# Patient Record
Sex: MALE | Race: WHITE | NOT HISPANIC OR LATINO | Employment: FULL TIME | URBAN - NONMETROPOLITAN AREA
[De-identification: names, ages, dates, MRNs, and addresses within clinical notes are randomized per-mention and may not be internally consistent; named-entity substitution may affect disease eponyms.]

---

## 2023-12-18 ENCOUNTER — APPOINTMENT (OUTPATIENT)
Dept: GENERAL RADIOLOGY | Facility: HOSPITAL | Age: 48
End: 2023-12-18
Payer: OTHER GOVERNMENT

## 2023-12-18 ENCOUNTER — HOSPITAL ENCOUNTER (EMERGENCY)
Facility: HOSPITAL | Age: 48
Discharge: HOME | End: 2023-12-18
Attending: EMERGENCY MEDICINE | Admitting: EMERGENCY MEDICINE
Payer: OTHER GOVERNMENT

## 2023-12-18 VITALS
TEMPERATURE: 97.9 F | WEIGHT: 315 LBS | HEIGHT: 74 IN | SYSTOLIC BLOOD PRESSURE: 137 MMHG | BODY MASS INDEX: 40.43 KG/M2 | RESPIRATION RATE: 18 BRPM | HEART RATE: 63 BPM | OXYGEN SATURATION: 99 % | DIASTOLIC BLOOD PRESSURE: 94 MMHG

## 2023-12-18 DIAGNOSIS — R07.9 CHEST PAIN, UNSPECIFIED TYPE: Primary | ICD-10-CM

## 2023-12-18 LAB
ALBUMIN SERPL-MCNC: 4.6 G/DL (ref 3.5–5.2)
ALBUMIN/GLOB SERPL: 1.5 G/DL
ALP SERPL-CCNC: 93 U/L (ref 39–117)
ALT SERPL W P-5'-P-CCNC: 19 U/L (ref 1–41)
AMPHET+METHAMPHET UR QL: NEGATIVE
AMPHETAMINES UR QL: NEGATIVE
ANION GAP SERPL CALCULATED.3IONS-SCNC: 11 MMOL/L (ref 5–15)
AST SERPL-CCNC: 23 U/L (ref 1–40)
BARBITURATES UR QL SCN: NEGATIVE
BASOPHILS # BLD AUTO: 0.04 10*3/MM3 (ref 0–0.2)
BASOPHILS NFR BLD AUTO: 0.6 % (ref 0–1.5)
BENZODIAZ UR QL SCN: NEGATIVE
BILIRUB SERPL-MCNC: 0.3 MG/DL (ref 0–1.2)
BUN SERPL-MCNC: 12 MG/DL (ref 6–20)
BUN/CREAT SERPL: 11.5 (ref 7–25)
BUPRENORPHINE SERPL-MCNC: NEGATIVE NG/ML
CALCIUM SPEC-SCNC: 10.1 MG/DL (ref 8.6–10.5)
CANNABINOIDS SERPL QL: NEGATIVE
CHLORIDE SERPL-SCNC: 104 MMOL/L (ref 98–107)
CO2 SERPL-SCNC: 26 MMOL/L (ref 22–29)
COCAINE UR QL: NEGATIVE
CREAT SERPL-MCNC: 1.04 MG/DL (ref 0.76–1.27)
D DIMER PPP FEU-MCNC: <0.27 MCGFEU/ML (ref 0–0.5)
DEPRECATED RDW RBC AUTO: 41.6 FL (ref 37–54)
EGFRCR SERPLBLD CKD-EPI 2021: 88.6 ML/MIN/1.73
EOSINOPHIL # BLD AUTO: 0.14 10*3/MM3 (ref 0–0.4)
EOSINOPHIL NFR BLD AUTO: 2.2 % (ref 0.3–6.2)
ERYTHROCYTE [DISTWIDTH] IN BLOOD BY AUTOMATED COUNT: 12.4 % (ref 12.3–15.4)
ETHANOL UR QL: <0.01 %
FENTANYL UR-MCNC: NEGATIVE NG/ML
GEN 5 2HR TROPONIN T REFLEX: <6 NG/L
GLOBULIN UR ELPH-MCNC: 3 GM/DL
GLUCOSE SERPL-MCNC: 89 MG/DL (ref 65–99)
HCT VFR BLD AUTO: 42.1 % (ref 37.5–51)
HGB BLD-MCNC: 14 G/DL (ref 13–17.7)
HOLD SPECIMEN: NORMAL
HOLD SPECIMEN: NORMAL
IMM GRANULOCYTES # BLD AUTO: 0.01 10*3/MM3 (ref 0–0.05)
IMM GRANULOCYTES NFR BLD AUTO: 0.2 % (ref 0–0.5)
LYMPHOCYTES # BLD AUTO: 1.83 10*3/MM3 (ref 0.7–3.1)
LYMPHOCYTES NFR BLD AUTO: 28.4 % (ref 19.6–45.3)
MCH RBC QN AUTO: 30.7 PG (ref 26.6–33)
MCHC RBC AUTO-ENTMCNC: 33.3 G/DL (ref 31.5–35.7)
MCV RBC AUTO: 92.3 FL (ref 79–97)
METHADONE UR QL SCN: NEGATIVE
MONOCYTES # BLD AUTO: 0.26 10*3/MM3 (ref 0.1–0.9)
MONOCYTES NFR BLD AUTO: 4 % (ref 5–12)
NEUTROPHILS NFR BLD AUTO: 4.16 10*3/MM3 (ref 1.7–7)
NEUTROPHILS NFR BLD AUTO: 64.6 % (ref 42.7–76)
NRBC BLD AUTO-RTO: 0 /100 WBC (ref 0–0.2)
NT-PROBNP SERPL-MCNC: <36 PG/ML (ref 0–450)
OPIATES UR QL: NEGATIVE
OXYCODONE UR QL SCN: NEGATIVE
PCP UR QL SCN: NEGATIVE
PLATELET # BLD AUTO: 271 10*3/MM3 (ref 140–450)
PMV BLD AUTO: 10.1 FL (ref 6–12)
POTASSIUM SERPL-SCNC: 4 MMOL/L (ref 3.5–5.2)
PROT SERPL-MCNC: 7.6 G/DL (ref 6–8.5)
RBC # BLD AUTO: 4.56 10*6/MM3 (ref 4.14–5.8)
SODIUM SERPL-SCNC: 141 MMOL/L (ref 136–145)
TRICYCLICS UR QL SCN: NEGATIVE
TROPONIN T DELTA: NORMAL
TROPONIN T SERPL HS-MCNC: 7 NG/L
WBC NRBC COR # BLD AUTO: 6.44 10*3/MM3 (ref 3.4–10.8)
WHOLE BLOOD HOLD COAG: NORMAL
WHOLE BLOOD HOLD SPECIMEN: NORMAL

## 2023-12-18 PROCEDURE — 80053 COMPREHEN METABOLIC PANEL: CPT | Performed by: EMERGENCY MEDICINE

## 2023-12-18 PROCEDURE — 71045 X-RAY EXAM CHEST 1 VIEW: CPT

## 2023-12-18 PROCEDURE — 99284 EMERGENCY DEPT VISIT MOD MDM: CPT

## 2023-12-18 PROCEDURE — 93005 ELECTROCARDIOGRAM TRACING: CPT

## 2023-12-18 PROCEDURE — 80307 DRUG TEST PRSMV CHEM ANLYZR: CPT | Performed by: EMERGENCY MEDICINE

## 2023-12-18 PROCEDURE — 85025 COMPLETE CBC W/AUTO DIFF WBC: CPT | Performed by: EMERGENCY MEDICINE

## 2023-12-18 PROCEDURE — 93005 ELECTROCARDIOGRAM TRACING: CPT | Performed by: EMERGENCY MEDICINE

## 2023-12-18 PROCEDURE — 85379 FIBRIN DEGRADATION QUANT: CPT | Performed by: EMERGENCY MEDICINE

## 2023-12-18 PROCEDURE — 82077 ASSAY SPEC XCP UR&BREATH IA: CPT | Performed by: EMERGENCY MEDICINE

## 2023-12-18 PROCEDURE — 84484 ASSAY OF TROPONIN QUANT: CPT | Performed by: EMERGENCY MEDICINE

## 2023-12-18 PROCEDURE — 36415 COLL VENOUS BLD VENIPUNCTURE: CPT

## 2023-12-18 PROCEDURE — 83880 ASSAY OF NATRIURETIC PEPTIDE: CPT | Performed by: EMERGENCY MEDICINE

## 2023-12-18 RX ORDER — ASPIRIN 81 MG/1
324 TABLET, CHEWABLE ORAL ONCE
Status: COMPLETED | OUTPATIENT
Start: 2023-12-18 | End: 2023-12-18

## 2023-12-18 RX ORDER — ASPIRIN 81 MG/1
81 TABLET ORAL DAILY
Qty: 30 TABLET | Refills: 0 | Status: SHIPPED | OUTPATIENT
Start: 2023-12-18 | End: 2024-01-17

## 2023-12-18 RX ORDER — SODIUM CHLORIDE 0.9 % (FLUSH) 0.9 %
10 SYRINGE (ML) INJECTION AS NEEDED
Status: DISCONTINUED | OUTPATIENT
Start: 2023-12-18 | End: 2023-12-18 | Stop reason: HOSPADM

## 2023-12-18 RX ADMIN — ASPIRIN 324 MG: 81 TABLET, CHEWABLE ORAL at 11:24

## 2023-12-18 NOTE — ED PROVIDER NOTES
Subjective   History of Present Illness  Patient is a 48 years old who came the echoing of chest discomfort left-sided chest pain reproducible on taking deep breath on palpation.  No history of trauma no history of any cardiac disease has a positive history of heart disease in the family.    Chest Pain  Pain location:  L chest  Pain quality: aching and tightness    Pain radiates to:  Does not radiate  Pain severity:  Moderate  Onset quality:  Gradual  Timing:  Constant  Progression:  Worsening  Chronicity:  New  Context: breathing    Context: not drug use, not eating, not lifting, not raising an arm, not at rest and not trauma    Relieved by:  Nothing  Worsened by:  Coughing and deep breathing  Ineffective treatments:  None tried  Associated symptoms: shortness of breath    Associated symptoms: no abdominal pain, no altered mental status, no anorexia, no back pain, no claudication, no cough, no dysphagia, no fatigue, no fever, no headache, no heartburn, no lower extremity edema, no nausea, no numbness, no orthopnea, no palpitations, no vomiting and no weakness    Risk factors: male sex and obesity    Risk factors: no aortic disease, no birth control, no coronary artery disease, no diabetes mellitus, no Tamir-Danlos syndrome, no high cholesterol, no immobilization, no Marfan's syndrome, no prior DVT/PE and no smoking    Shortness of Breath  Associated symptoms: chest pain    Associated symptoms: no abdominal pain, no claudication, no cough, no fever, no headaches, no neck pain and no vomiting        Review of Systems   Constitutional: Negative.  Negative for fatigue and fever.   HENT: Negative.  Negative for trouble swallowing.    Respiratory:  Positive for shortness of breath. Negative for cough.    Cardiovascular:  Positive for chest pain. Negative for palpitations, orthopnea and claudication.   Gastrointestinal: Negative.  Negative for abdominal distention, abdominal pain, anorexia, heartburn, nausea and  vomiting.   Endocrine: Negative.    Genitourinary: Negative.    Musculoskeletal: Negative.  Negative for back pain and neck pain.   Skin:  Negative for color change and pallor.   Neurological: Negative.  Negative for syncope, weakness, light-headedness, numbness and headaches.   Hematological: Negative.  Does not bruise/bleed easily.   All other systems reviewed and are negative.      Past Medical History:   Diagnosis Date    Back pain     Hyperlipidemia     Sleep apnea     Tremors of nervous system        Allergies   Allergen Reactions    Coconut Swelling       Past Surgical History:   Procedure Laterality Date    SHOULDER SURGERY         History reviewed. No pertinent family history.    Social History     Socioeconomic History    Marital status:    Tobacco Use    Smoking status: Never    Smokeless tobacco: Current     Types: Chew   Substance and Sexual Activity    Alcohol use: Not Currently    Drug use: Not Currently           Objective   Physical Exam  Vitals and nursing note reviewed. Exam conducted with a chaperone present.   Constitutional:       General: He is not in acute distress.     Appearance: Normal appearance. He is well-developed. He is not toxic-appearing.   HENT:      Head: Normocephalic and atraumatic.      Nose: Nose normal.      Mouth/Throat:      Mouth: Mucous membranes are moist.      Pharynx: Uvula midline.   Eyes:      General: Lids are normal. Lids are everted, no foreign bodies appreciated.      Conjunctiva/sclera: Conjunctivae normal.      Pupils: Pupils are equal, round, and reactive to light.   Neck:      Vascular: Normal carotid pulses. No carotid bruit or JVD.      Trachea: Trachea and phonation normal. No tracheal deviation.   Cardiovascular:      Rate and Rhythm: Normal rate and regular rhythm.      Chest Wall: PMI is not displaced.      Pulses: Normal pulses.      Heart sounds: Normal heart sounds.      No gallop.   Pulmonary:      Effort: Pulmonary effort is normal. No  tachypnea, accessory muscle usage or respiratory distress.      Breath sounds: Normal breath sounds. No stridor. No decreased breath sounds, wheezing, rhonchi or rales.   Chest:      Comments: Chest wall tenderness on by palpation  Abdominal:      General: Bowel sounds are normal. There is no distension.      Palpations: Abdomen is soft.      Tenderness: There is no abdominal tenderness.   Musculoskeletal:         General: No swelling. Normal range of motion.      Cervical back: Full passive range of motion without pain, normal range of motion and neck supple. No rigidity.      Comments: Lower extremity exam bilaterally is unremarkable.  There is no right or left calf tenderness .  There is no palpable venous cord.  No obvious difference in the size of the legs.  No pitting edema.  The dorsalis pedis and posterior tibial femoral and popliteal pulses are palpable and +2 bilaterally.  Homans sign is negative   Skin:     General: Skin is warm and dry.      Capillary Refill: Capillary refill takes less than 2 seconds.      Coloration: Skin is not jaundiced or pale.      Nails: There is no clubbing.   Neurological:      General: No focal deficit present.      Mental Status: He is alert and oriented to person, place, and time.      GCS: GCS eye subscore is 4. GCS verbal subscore is 5. GCS motor subscore is 6.      Cranial Nerves: No cranial nerve deficit.      Motor: Motor function is intact.      Gait: Gait normal.      Deep Tendon Reflexes: Reflexes are normal and symmetric. Reflexes normal.   Psychiatric:         Speech: Speech normal.         Behavior: Behavior normal.         Procedures           ED Course  ED Course as of 12/18/23 1535   Mon Dec 18, 2023   1158 bradycardia [TS]   1305 nsr [TS]   1533 Patient given reproducible chest discomfort and tightness.  Workup is negative dimer is negative.  I have discussed this case at length with the patient offered him a stress test but he does not want to stay for that he  is agreeable to getting as an outpatient. [TS]   1533 Patient is awake and alert in no distress.  And in no pain. [TS]      ED Course User Index  [TS] Dereck Romo MD                HEART Score: 1                              Medical Decision Making  Differential Diagnosis:  I considered chest wall pain, muscle strain, costochondritis, pleurisy, rib fracture, herpes zoster, cardiovascular etiology, myocardial infarction, intermediate coronary syndrome, unstable angina, angina, aortic dissection, pericarditis, pulmonary etiology, pulmonary embolism, pneumonia, pneumothorax, lung cancer, gastroesophageal reflux disease, esophagitis, esophageal spasm and gastrointestinal etiology as a possible cause of chest pain in this patient. This is a partial list of diagnoses considered.        Problems Addressed:  Chest pain, unspecified type: acute illness or injury     Details: Nonspecific chest pain pain-free at this time.    Amount and/or Complexity of Data Reviewed  Labs: ordered.     Details: Labs are normal  Radiology: ordered.     Details: Chest x-ray is negative  ECG/medicine tests: ordered.     Details: Nonischemic    Risk  OTC drugs.  Prescription drug management.  Risk Details: PERC score 0  Years Algorithm for Pulmonary Embolus  To be used in hemodynamically stable patient >18 years old    No signs of DVT are present, there is no hemoptysis, PE is not the most likely diagnosis and the D-dimer is not greater or equal to 1000 ng/mL. Therefore PE is excluded . The Years algorithm rules out PE (0.43 % with symptomatic VTE during 3-month follow-up)  Heart score 1  This patient presents with chest pain , patient arrived hemodynamically stable was placed on the monitor and IV access obtained EKG was obtained and did not reveal any malignant/unstable dysrhythmias any acute ST elevation, no evidence of Brugada, or significantly prolonged QT .  Presentation not consistent with other acute, emergent cause of chest pain at  this time.  Low suspicion for acute PE is low risk per Wells and Years criteria and no evidence of DVT such as calf swelling, tenderness, palpable tortuous lower extremity vein, or Homans' sign.  Low suspicion for pneumothorax as the patient is saturating well and has no radiographic evidence of a pneumothorax.  Low suspicion for dissection there is no widened mediastinum, hypotension, pulses deficit, and no tearing back/abdominal pain.  Low suspicion for tamponade as there is no JVD, muffled heart sounds, electrical alternans on EKG, and no hypotension.  Low suspicion for pericarditis as there is no diffuse ST elevation or MO depression and the patient is afebrile.  No evidence of GI bleed per patient's history.  Low suspicion for CHF exacerbation as there is no evidence of volume overload and no evidence of severely elevated BNP.  Low suspicion for pneumonia since the patient has no fever no productive cough no chest x-ray findings of pneumonia and no leukocytosis.         Final diagnoses:   Chest pain, unspecified type       ED Disposition  ED Disposition       ED Disposition   Discharge    Condition   Stable    Comment   --               Fran Elena MD  [address] [place] [address] [phone]    Schedule an appointment as soon as possible for a visit            Medication List        New Prescriptions      aspirin 81 MG EC tablet  Take 1 tablet by mouth Daily for 30 days.               Where to Get Your Medications        These medications were sent to [id number] - [place], [place] - [address] - [phone]  - [phone] FX  [address] [place] [address]      Phone: [phone]   aspirin 81 MG EC tablet            Dereck Romo MD  12/18/23 1150       Dereck Romo MD  12/18/23 7306

## 2023-12-19 LAB
QT INTERVAL: 410 MS
QT INTERVAL: 434 MS
QTC INTERVAL: 388 MS
QTC INTERVAL: 415 MS

## 2024-05-03 ENCOUNTER — APPOINTMENT (OUTPATIENT)
Dept: OTHER | Facility: HOSPITAL | Age: 49
End: 2024-05-03
Payer: OTHER GOVERNMENT

## 2024-05-22 ENCOUNTER — OFFICE VISIT (OUTPATIENT)
Dept: NEUROSURGERY | Facility: CLINIC | Age: 49
End: 2024-05-22
Payer: OTHER GOVERNMENT

## 2024-05-22 VITALS — WEIGHT: 315 LBS | HEIGHT: 74 IN | BODY MASS INDEX: 40.43 KG/M2

## 2024-05-22 DIAGNOSIS — M54.12 CERVICAL RADICULOPATHY: ICD-10-CM

## 2024-05-22 DIAGNOSIS — M51.26 LUMBAR DISC HERNIATION: ICD-10-CM

## 2024-05-22 DIAGNOSIS — Z78.9 NONSMOKER: ICD-10-CM

## 2024-05-22 DIAGNOSIS — M51.36 LUMBAR DEGENERATIVE DISC DISEASE: ICD-10-CM

## 2024-05-22 DIAGNOSIS — E66.01 CLASS 3 SEVERE OBESITY DUE TO EXCESS CALORIES WITHOUT SERIOUS COMORBIDITY WITH BODY MASS INDEX (BMI) OF 40.0 TO 44.9 IN ADULT: ICD-10-CM

## 2024-05-22 DIAGNOSIS — M54.16 LUMBAR RADICULOPATHY: ICD-10-CM

## 2024-05-22 DIAGNOSIS — M50.321 OTHER CERVICAL DISC DEGENERATION AT C4-C5 LEVEL: ICD-10-CM

## 2024-05-22 RX ORDER — CYCLOBENZAPRINE HCL 10 MG
10 TABLET ORAL 3 TIMES DAILY PRN
COMMUNITY

## 2024-05-22 RX ORDER — LITHIUM CARBONATE 300 MG/1
300 CAPSULE ORAL DAILY
COMMUNITY

## 2024-05-22 RX ORDER — MONTELUKAST SODIUM 10 MG/1
10 TABLET ORAL EVERY EVENING
COMMUNITY

## 2024-05-22 RX ORDER — CYCLOBENZAPRINE HCL 10 MG
10 TABLET ORAL 3 TIMES DAILY PRN
Qty: 90 TABLET | Refills: 2 | Status: SHIPPED | OUTPATIENT
Start: 2024-05-22

## 2024-05-22 RX ORDER — DICLOFENAC SODIUM AND MISOPROSTOL 75; 200 MG/1; UG/1
1 TABLET, DELAYED RELEASE ORAL 2 TIMES DAILY
COMMUNITY

## 2024-05-22 RX ORDER — GEMFIBROZIL 600 MG/1
600 TABLET, FILM COATED ORAL
COMMUNITY

## 2024-05-22 RX ORDER — GABAPENTIN 100 MG/1
100 CAPSULE ORAL 3 TIMES DAILY
Qty: 90 CAPSULE | Refills: 2 | Status: SHIPPED | OUTPATIENT
Start: 2024-05-22

## 2024-05-22 RX ORDER — TAMSULOSIN HYDROCHLORIDE 0.4 MG/1
1 CAPSULE ORAL DAILY
COMMUNITY

## 2024-05-22 NOTE — PATIENT INSTRUCTIONS
"BMI for Adults  What is BMI?  Body mass index (BMI) is a number that is calculated from a person's weight and height. BMI can help estimate how much of a person's weight is composed of fat. BMI does not measure body fat directly. Rather, it is an alternative to procedures that directly measure body fat, which can be difficult and expensive.  BMI can help identify people who may be at higher risk for certain medical problems.  What are BMI measurements used for?  BMI is used as a screening tool to identify possible weight problems. It helps determine whether a person is obese, overweight, a healthy weight, or underweight.  BMI is useful for:  Identifying a weight problem that may be related to a medical condition or may increase the risk for medical problems.  Promoting changes, such as changes in diet and exercise, to help reach a healthy weight. BMI screening can be repeated to see if these changes are working.  How is BMI calculated?  BMI involves measuring your weight in relation to your height. Both height and weight are measured, and the BMI is calculated from those numbers. This can be done either in English ([place] or metric measurements. Note that charts and online BMI calculators are available to help you find your BMI quickly and easily without having to do these calculations yourself.  To calculate your BMI in English ([place] measurements:    Measure your weight in pounds (lb).  Multiply the number of pounds by 703.  For example, for a person who weighs 180 lb, multiply that number by 703, which equals 126,540.  Measure your height in inches. Then multiply that number by itself to get a measurement called \"inches squared.\"  For example, for a person who is 70 inches tall, the \"inches squared\" measurement is 70 inches x 70 inches, which equals 4,900 inches squared.  Divide the total from step 2 (number of lb x 703) by the total from step 3 (inches squared): 126,540 ÷ 4,900 = 25.8. This is your BMI.    To " "calculate your BMI in metric measurements:  Measure your weight in kilograms (kg).  Measure your height in meters (m). Then multiply that number by itself to get a measurement called \"meters squared.\"  For example, for a person who is 1.75 m tall, the \"meters squared\" measurement is 1.75 m x 1.75 m, which is equal to 3.1 meters squared.  Divide the number of kilograms (your weight) by the meters squared number. In this example: 70 ÷ 3.1 = 22.6. This is your BMI.  What do the results mean?  BMI charts are used to identify whether you are underweight, normal weight, overweight, or obese. The following guidelines will be used:  Underweight: BMI less than 18.5.  Normal weight: BMI between 18.5 and 24.9.  Overweight: BMI between 25 and 29.9.  Obese: BMI of 30 or above.  Keep these notes in mind:  Weight includes both fat and muscle, so someone with a muscular build, such as an athlete, may have a BMI that is higher than 24.9. In cases like these, BMI is not an accurate measure of body fat.  To determine if excess body fat is the cause of a BMI of 25 or higher, further assessments may need to be done by a health care provider.  BMI is usually interpreted in the same way for men and women.  Where to find more information  For more information about BMI, including tools to quickly calculate your BMI, go to these websites:  Centers for Disease Control and Prevention: www.cdc.gov  American Heart Association: www.heart.org  National Heart, Lung, and Blood [place]: www.nhlbi.nih.gov  Summary  Body mass index (BMI) is a number that is calculated from a person's weight and height.  BMI may help estimate how much of a person's weight is composed of fat. BMI can help identify those who may be at higher risk for certain medical problems.  BMI can be measured using English measurements or metric measurements.  BMI charts are used to identify whether you are underweight, normal weight, overweight, or obese.  This information is not " "intended to replace advice given to you by your health care provider. Make sure you discuss any questions you have with your health care provider.  Document Revised: 09/09/2020 Document Reviewed: 07/17/2020  [employer] Patient Education © 2021 [employer] https://www.nhlbi.nih.gov/files/docs/public/heart/dash_brief.pdf\">   DASH Eating Plan  DASH stands for Dietary Approaches to Stop Hypertension. The DASH eating plan is a healthy eating plan that has been shown to:  Reduce high blood pressure (hypertension).  Reduce your risk for type 2 diabetes, heart disease, and stroke.  Help with weight loss.  What are tips for following this plan?  Reading food labels  Check food labels for the amount of salt (sodium) per serving. Choose foods with less than 5 percent of the Daily Value of sodium. Generally, foods with less than 300 milligrams (mg) of sodium per serving fit into this eating plan.  To find whole grains, look for the word \"whole\" as the first word in the ingredient list.  Shopping  Buy products labeled as \"low-sodium\" or \"no salt added.\"  Buy fresh foods. Avoid canned foods and pre-made or frozen meals.  Cooking  Avoid adding salt when cooking. Use salt-free seasonings or herbs instead of table salt or sea salt. Check with your health care provider or pharmacist before using salt substitutes.  Do not zamorano foods. Cook foods using healthy methods such as baking, boiling, grilling, roasting, and broiling instead.  Cook with heart-healthy oils, such as olive, canola, avocado, soybean, or sunflower oil.  Meal planning    Eat a balanced diet that includes:  4 or more servings of fruits and 4 or more servings of vegetables each day. Try to fill one-half of your plate with fruits and vegetables.  6-8 servings of whole grains each day.  Less than 6 oz (170 g) of lean meat, poultry, or fish each day. A 3-oz (85-g) serving of meat is about the same size as a deck of cards. One egg equals 1 oz (28 g).  2-3 servings of low-fat " dairy each day. One serving is 1 cup (237 mL).  1 serving of nuts, seeds, or beans 5 times each week.  2-3 servings of heart-healthy fats. Healthy fats called omega-3 fatty acids are found in foods such as walnuts, flaxseeds, fortified milks, and eggs. These fats are also found in cold-water fish, such as sardines, salmon, and mackerel.  Limit how much you eat of:  Canned or prepackaged foods.  Food that is high in trans fat, such as some fried foods.  Food that is high in saturated fat, such as fatty meat.  Desserts and other sweets, sugary drinks, and other foods with added sugar.  Full-fat dairy products.  Do not salt foods before eating.  Do not eat more than 4 egg yolks a week.  Try to eat at least 2 vegetarian meals a week.  Eat more home-cooked food and less restaurant, buffet, and fast food.    Lifestyle  When eating at a restaurant, ask that your food be prepared with less salt or no salt, if possible.  If you drink alcohol:  Limit how much you use to:  0-1 drink a day for women who are not pregnant.  0-2 drinks a day for men.  Be aware of how much alcohol is in your drink. In the [place] one drink equals one 12 oz bottle of beer (355 mL), one 5 oz glass of wine (148 mL), or one 1½ oz glass of hard liquor (44 mL).  General information  Avoid eating more than 2,300 mg of salt a day. If you have hypertension, you may need to reduce your sodium intake to 1,500 mg a day.  Work with your health care provider to maintain a healthy body weight or to lose weight. Ask what an ideal weight is for you.  Get at least 30 minutes of exercise that causes your heart to beat faster (aerobic exercise) most days of the week. Activities may include walking, swimming, or biking.  Work with your health care provider or dietitian to adjust your eating plan to your individual calorie needs.  What foods should I eat?  Fruits  All fresh, dried, or frozen fruit. Canned fruit in natural juice (without added sugar).  Vegetables  Fresh or  frozen vegetables (raw, steamed, roasted, or grilled). Low-sodium or reduced-sodium tomato and vegetable juice. Low-sodium or reduced-sodium tomato sauce and tomato paste. Low-sodium or reduced-sodium canned vegetables.  Grains  Whole-grain or whole-wheat bread. Whole-grain or whole-wheat pasta. Brown rice. Oatmeal. Quinoa. Bulgur. Whole-grain and low-sodium cereals. Portia bread. Low-fat, low-sodium crackers. Whole-wheat flour tortillas.  Meats and other proteins  Skinless chicken or turkey. Ground chicken or turkey. Pork with fat trimmed off. Fish and seafood. Egg whites. Dried beans, peas, or lentils. Unsalted nuts, nut butters, and seeds. Unsalted canned beans. Lean cuts of beef with fat trimmed off. Low-sodium, lean precooked or cured meat, such as sausages or meat loaves.  Dairy  Low-fat (1%) or fat-free (skim) milk. Reduced-fat, low-fat, or fat-free cheeses. Nonfat, low-sodium ricotta or cottage cheese. Low-fat or nonfat yogurt. Low-fat, low-sodium cheese.  Fats and oils  Soft margarine without trans fats. Vegetable oil. Reduced-fat, low-fat, or light mayonnaise and salad dressings (reduced-sodium). Canola, safflower, olive, avocado, soybean, and sunflower oils. Avocado.  Seasonings and condiments  Herbs. Spices. Seasoning mixes without salt.  Other foods  Unsalted popcorn and pretzels. Fat-free sweets.  The items listed above may not be a complete list of foods and beverages you can eat. Contact a dietitian for more information.  What foods should I avoid?  Fruits  Canned fruit in a light or heavy syrup. Fried fruit. Fruit in cream or butter sauce.  Vegetables  Creamed or fried vegetables. Vegetables in a cheese sauce. Regular canned vegetables (not low-sodium or reduced-sodium). Regular canned tomato sauce and paste (not low-sodium or reduced-sodium). Regular tomato and vegetable juice (not low-sodium or reduced-sodium). Pickles. Olives.  Grains  Baked goods made with fat, such as croissants, muffins, or some  breads. Dry pasta or rice meal packs.  Meats and other proteins  Fatty cuts of meat. Ribs. Fried meat. Gautam. Bologna, salami, and other precooked or cured meats, such as sausages or meat loaves. Fat from the back of a pig (fatback). Bratwurst. Salted nuts and seeds. Canned beans with added salt. Canned or smoked fish. Whole eggs or egg yolks. Chicken or turkey with skin.  Dairy  Whole or 2% milk, cream, and half-and-half. Whole or full-fat cream cheese. Whole-fat or sweetened yogurt. Full-fat cheese. Nondairy creamers. Whipped toppings. Processed cheese and cheese spreads.  Fats and oils  Butter. Stick margarine. Lard. Shortening. Ghee. Gautam fat. Tropical oils, such as coconut, palm kernel, or palm oil.  Seasonings and condiments  Onion salt, garlic salt, seasoned salt, table salt, and sea salt. Worcestershire sauce. Tartar sauce. Barbecue sauce. Teriyaki sauce. Soy sauce, including reduced-sodium. Steak sauce. Canned and packaged gravies. Fish sauce. Oyster sauce. Cocktail sauce. Store-bought horseradish. Ketchup. Mustard. Meat flavorings and tenderizers. Bouillon cubes. Hot sauces. Pre-made or packaged marinades. Pre-made or packaged taco seasonings. Relishes. Regular salad dressings.  Other foods  Salted popcorn and pretzels.  The items listed above may not be a complete list of foods and beverages you should avoid. Contact a dietitian for more information.  Where to find more information  National Heart, Lung, and Blood [place]: www.nhlbi.nih.gov  American Heart Association: www.heart.org  Academy of Nutrition and Dietetics: www.eatright.org  National Kidney Foundation: www.kidney.org  Summary  The DASH eating plan is a healthy eating plan that has been shown to reduce high blood pressure (hypertension). It may also reduce your risk for type 2 diabetes, heart disease, and stroke.  When on the DASH eating plan, aim to eat more fresh fruits and vegetables, whole grains, lean proteins, low-fat dairy, and  heart-healthy fats.  With the DASH eating plan, you should limit salt (sodium) intake to 2,300 mg a day. If you have hypertension, you may need to reduce your sodium intake to 1,500 mg a day.  Work with your health care provider or dietitian to adjust your eating plan to your individual calorie needs.  This information is not intended to replace advice given to you by your health care provider. Make sure you discuss any questions you have with your health care provider.  Document Revised: 11/20/2020 Document Reviewed: 11/20/2020  [employer] Patient Education © 2021 [employer] High-Protein and High-Calorie Diet  Eating high-protein and high-calorie foods can help you to gain weight, heal after an injury, and recover after an illness or surgery. The specific amount of daily protein and calories you need depends on:  Your body weight.  The reason this diet is recommended for you.  What is my plan?  Generally, a high-protein, high-calorie diet involves:  Eating 250-500 extra calories each day.  Making sure that you get enough of your daily calories from protein. Ask your health care provider how many of your calories should come from protein.  Talk with a health care provider, such as a diet and nutrition specialist (dietitian), about how much protein and how many calories you need each day. Follow the diet as directed by your health care provider.  What are tips for following this plan?    Preparing meals  Add whole milk, half-and-half, or heavy cream to cereal, pudding, soup, or hot cocoa.  Add whole milk to instant breakfast drinks.  Add peanut butter to oatmeal or smoothies.  Add powdered milk to baked goods, smoothies, or milkshakes.  Add powdered milk, cream, or butter to mashed potatoes.  Add cheese to cooked vegetables.  Make whole-milk yogurt parfaits. Top them with granola, fruit, or nuts.  Add cottage cheese to your fruit.  Add avocado, cheese, or both to sandwiches or salads.  Add meat, poultry, or seafood  to rice, pasta, casseroles, salads, and soups.  Use mayonnaise when making egg salad, chicken salad, or tuna salad.  Use peanut butter as a dip for vegetables or as a topping for pretzels, celery, or crackers.  Add beans to casseroles, dips, and spreads.  Add pureed beans to sauces and soups.  Replace calorie-free drinks with calorie-containing drinks, such as milk and fruit juice.  Replace water with milk or heavy cream when making foods such as oatmeal, pudding, or cocoa.  General instructions  Ask your health care provider if you should take a nutritional supplement.  Try to eat six small meals each day instead of three large meals.  Eat a balanced diet. In each meal, include one food that is high in protein.  Keep nutritious snacks available, such as nuts, trail mixes, dried fruit, and yogurt.  If you have kidney disease or diabetes, talk with your health care provider about how much protein is safe for you. Too much protein may put extra stress on your kidneys.  Drink your calories. Choose high-calorie drinks and have them after your meals.  What high-protein foods should I eat?    Vegetables  Soybeans. Peas.  Grains  Quinoa. Bulgur wheat.  Meats and other proteins  Beef, pork, and poultry. Fish and seafood. Eggs. Tofu. Textured vegetable protein (TVP). Peanut butter. Nuts and seeds. Dried beans. Protein powders.  Dairy  Whole milk. Whole-milk yogurt. Powdered milk. Cheese. Cottage Cheese. Eggnog.  Beverages  High-protein supplement drinks. Soy milk.  Other foods  Protein bars.  The items listed above may not be a complete list of high-protein foods and beverages. Contact a dietitian for more options.  What high-calorie foods should I eat?  Fruits  Dried fruit. Fruit leather. Canned fruit in syrup. Fruit juice. Avocado.  Vegetables  Vegetables cooked in oil or butter. Fried potatoes.  Grains  Pasta. Quick breads. Muffins. Pancakes. Ready-to-eat cereal.  Meats and other proteins  Peanut butter. Nuts and  seeds.  Dairy  Heavy cream. Whipped cream. Cream cheese. Sour cream. Ice cream. Custard. Pudding.  Beverages  Meal-replacement beverages. Nutrition shakes. Fruit juice. Sugar-sweetened soft drinks.  Seasonings and condiments  Salad dressing. Mayonnaise. Edil sauce. Fruit preserves or jelly. Honey. Syrup.  Sweets and desserts  Cake. Cookies. Pie. Pastries. Candy bars. Chocolate.  Fats and oils  Butter or margarine. Oil. Gravy.  Other foods  Meal-replacement bars.  The items listed above may not be a complete list of high-calorie foods and beverages. Contact a dietitian for more options.  Summary  A high-protein, high-calorie diet can help you gain weight or heal faster after an injury, illness, or surgery.  To increase your protein and calories, add ingredients such as whole milk, peanut butter, cheese, beans, meat, or seafood to meal items.  To get enough extra calories each day, include high-calorie foods and beverages at each meal.  Adding a high-calorie drink or shake can be an easy way to help you get enough calories each day. Talk with your healthcare provider or dietitian about the best options for you.  This information is not intended to replace advice given to you by your health care provider. Make sure you discuss any questions you have with your health care provider.  Document Revised: 11/30/2018 Document Reviewed: 10/30/2018  [employer] Patient Education © 2021 [employer]

## 2024-05-22 NOTE — PROGRESS NOTES
Chief complaint:   Chief Complaint   Patient presents with    NECK & BACK PAIN     Pt constant neck and lbp. Pt states he has been having numbness and tingling R hand and arm. Pt states he has not had any physical therapy or pain mgmt. Pt states he has seen a chiropractor last visit 05/20/24.        Subjective     HPI: This is a 48-year-old male gentleman who was referred to us by MONTSE Chung for neck and back pain.  The patient says that the pain in his neck has been going on for months but over the last few weeks he started to have pain going over to his right upper extremity.  Currently the pain in his neck is constant.  It is worse with working overhead or turning his neck.  It is better with laying down.  He has pain that radiates over into his right upper extremity in a radicular fashion all the way to his hand along with numbness and tingling.  This is intermittent.  It is worse with repetitive movements.  It is better with changing his position.  He is fixing to be evaluated for left shoulder issue as well.  Denies any bowel or bladder incontinence.  Is not done any recent physical therapy or pain management injections.  He is attempted chiropractic care without any improvement.  He does take diclofenac and Flexeril.    Patient is also been complaining of back pain that is been going on for over 4 years.  Currently the pain in his back is constant.  It is worse with twisting and better with heat.  He has pain that radiates into his left lower extremity in a lateral radicular fashion to his knee.  This pain is intermittent.  It is worse with movement.  It is better with resting.  He has not done any recent physical therapy or pain management injections.  He is attempted chiropractic care without any improvement.  He is right-hand dominant.  He works as a .  He is .  Denies any cigarettes or illicit drug use.  He does drink alcohol.    Review of Systems   Constitutional:  Positive for  "fatigue.   Genitourinary:  Positive for difficulty urinating and genital sores.   Musculoskeletal:  Positive for back pain, myalgias, neck pain and neck stiffness.   Neurological:  Positive for tremors, weakness and numbness.   Psychiatric/Behavioral:  Positive for hallucinations. The patient is nervous/anxious.    All other systems reviewed and are negative.       Past Medical History:   Diagnosis Date    Anxiousness     Back pain     Back pain     Difficulty urinating     Genital sore     Hallucinations     Hyperlipidemia     Multiple food allergies     Neck pain     Nervousness     NS (neck stiffness)     Numbness and tingling of right upper extremity     Sleep apnea     Tremors of nervous system     Tremors of nervous system     Weakness      Past Surgical History:   Procedure Laterality Date    SHOULDER SURGERY Bilateral 2004 2016     History reviewed. No pertinent family history.  Social History     Tobacco Use    Smoking status: Never    Smokeless tobacco: Current     Types: Chew   Vaping Use    Vaping status: Never Used   Substance Use Topics    Alcohol use: Not Currently    Drug use: Not Currently     (Not in a hospital admission)    Allergies:  Coconut, Coconut (cocos nucifera), and Tramadol    Objective      Vital Signs  Ht 188 cm (74\")   Wt (!) 146 kg (321 lb)   BMI 41.21 kg/m²     Physical Exam  Constitutional:       Appearance: Normal appearance. He is well-developed.   HENT:      Head: Normocephalic.   Eyes:      General: Lids are normal.      Extraocular Movements: EOM normal.      Conjunctiva/sclera: Conjunctivae normal.      Pupils: Pupils are equal, round, and reactive to light.   Pulmonary:      Effort: Pulmonary effort is normal.      Breath sounds: Normal breath sounds.   Musculoskeletal:         General: Normal range of motion.      Cervical back: Normal range of motion.   Skin:     General: Skin is warm.   Neurological:      Mental Status: He is alert and oriented to person, place, and " time.      GCS: GCS eye subscore is 4. GCS verbal subscore is 5. GCS motor subscore is 6.      Cranial Nerves: No cranial nerve deficit.      Sensory: No sensory deficit.      Motor: Motor strength is normal.     Gait: Gait is intact.      Deep Tendon Reflexes: Reflexes are normal and symmetric. Reflexes normal.   Psychiatric:         Speech: Speech normal.         Behavior: Behavior normal.         Thought Content: Thought content normal.         Neurologic Exam     Mental Status   Oriented to person, place, and time.   Attention: normal. Concentration: normal.   Speech: speech is normal   Level of consciousness: alert  Normal comprehension.     Cranial Nerves     CN II   Visual fields full to confrontation.     CN III, IV, VI   Pupils are equal, round, and reactive to light.  Extraocular motions are normal.     CN V   Facial sensation intact.     CN VII   Facial expression full, symmetric.     CN VIII   CN VIII normal.     CN IX, X   CN IX normal.   CN X normal.     CN XI   CN XI normal.     CN XII   CN XII normal.     Motor Exam   Muscle bulk: normal    Strength   Strength 5/5 throughout.     Sensory Exam   Light touch normal.     Gait, Coordination, and Reflexes     Gait  Gait: normal    Reflexes   Reflexes 2+ except as noted.       Imaging review: The lumbar spine that was done April 22, 2024 shows a disc protrusion at L5-S1 off to the right causing right-sided foraminal narrowing along with disc degeneration.  At L4-5 there is a disc herniation to the right that is far lateral.  No fracture visualized.  No cord signal change.    X-ray shows disc degeneration at L5-S1.  No malalignment or fracture    L4-5      L5-S1      MRI of the cervical spine notes on April 22, 2024 shows disc bulging prominent at C5-6 off to the left causing left-sided foraminal narrowing.  No fracture visualized.  No cord signal change.    X-ray of the cervical spine shows disc degeneration prominent at C4-5.        C5-6      MRI of the  right shoulder did show degeneration    Assessment/Plan:   1.  Neck pain  The patient is complaining of neck pain with right-sided radiculopathy.  The foraminal narrowing that is present on the MRI is prominent on the left side.  He is can be worked up by orthopedics for his shoulder issue.  We will start him on gabapentin and refill his Flexeril to see if this will help with his pain issues.    2.  Back pain  Patient is complaining of back pain and left lower extremity pain.  The imaging shows foraminal narrowing prominent on the right side.  We will start him on gabapentin to see if this will help with his pain issues and refill the Flexeril.  For first line conservative care of lumbar pain, I would like to send Mr. Krueger for a dedicated course of physician directed physical therapy consisting of 2-3 times a week for 4-6 weeks.    Return for reassessment with me after 6-8 weeks after physical therapy.     I also recommend that he be evaluated in the pain management clinic.  I would recommend [facility].  He is going to talk to his primary care doctor about getting a referral.    I advised the patient to call and return sooner for new or worsening complaints of weakness, paresthesias, gait disturbances, or any additional concerns.  Treatment options discussed in detail with Paula and the patient voiced understanding.  Mr. Krueger agrees with this plan of care.     Patient is a nonsmoker  The patient's Body mass index is 41.21 kg/m².. BMI is above normal parameters. Recommendations include: educational material and nutrition counseling    Diagnoses and all orders for this visit:    1. Cervical radiculopathy  -     gabapentin (Neurontin) 100 MG capsule; Take 1 capsule by mouth 3 (Three) Times a Day.  Dispense: 90 capsule; Refill: 2    2. Other cervical disc degeneration at C4-C5 level    3. Lumbar degenerative disc disease  -     Ambulatory Referral to Physical Therapy for Evaluation & Treatment    4.  Lumbar radiculopathy  -     Ambulatory Referral to Physical Therapy for Evaluation & Treatment  -     gabapentin (Neurontin) 100 MG capsule; Take 1 capsule by mouth 3 (Three) Times a Day.  Dispense: 90 capsule; Refill: 2    5. Lumbar disc herniation  -     Ambulatory Referral to Physical Therapy for Evaluation & Treatment    6. Class 3 severe obesity due to excess calories without serious comorbidity with body mass index (BMI) of 40.0 to 44.9 in adult    7. Nonsmoker    Other orders  -     cyclobenzaprine (FLEXERIL) 10 MG tablet; Take 1 tablet by mouth 3 (Three) Times a Day As Needed for Muscle Spasms.  Dispense: 90 tablet; Refill: 2          I discussed the patients findings and my recommendations with patient    Charles Terrell, APRN  05/22/24  09:48 CDT

## 2024-07-11 ENCOUNTER — OFFICE VISIT (OUTPATIENT)
Dept: NEUROSURGERY | Facility: CLINIC | Age: 49
End: 2024-07-11
Payer: OTHER GOVERNMENT

## 2024-07-11 VITALS — BODY MASS INDEX: 40.43 KG/M2 | WEIGHT: 315 LBS | HEIGHT: 74 IN

## 2024-07-11 DIAGNOSIS — Z78.9 NONSMOKER: ICD-10-CM

## 2024-07-11 DIAGNOSIS — M50.321 OTHER CERVICAL DISC DEGENERATION AT C4-C5 LEVEL: ICD-10-CM

## 2024-07-11 DIAGNOSIS — M54.12 CERVICAL RADICULOPATHY: Primary | ICD-10-CM

## 2024-07-11 DIAGNOSIS — M51.36 LUMBAR DEGENERATIVE DISC DISEASE: ICD-10-CM

## 2024-07-11 DIAGNOSIS — M54.16 LUMBAR RADICULOPATHY: ICD-10-CM

## 2024-07-11 DIAGNOSIS — M51.26 LUMBAR DISC HERNIATION: ICD-10-CM

## 2024-07-11 DIAGNOSIS — E66.01 CLASS 3 SEVERE OBESITY DUE TO EXCESS CALORIES WITHOUT SERIOUS COMORBIDITY WITH BODY MASS INDEX (BMI) OF 40.0 TO 44.9 IN ADULT: ICD-10-CM

## 2024-07-11 PROCEDURE — 99213 OFFICE O/P EST LOW 20 MIN: CPT | Performed by: NURSE PRACTITIONER

## 2024-07-11 RX ORDER — GABAPENTIN 100 MG/1
100 CAPSULE ORAL 3 TIMES DAILY
Qty: 90 CAPSULE | Refills: 2 | Status: SHIPPED | OUTPATIENT
Start: 2024-07-11

## 2024-07-11 NOTE — PATIENT INSTRUCTIONS
"BMI for Adults  What is BMI?  Body mass index (BMI) is a number that is calculated from a person's weight and height. BMI can help estimate how much of a person's weight is composed of fat. BMI does not measure body fat directly. Rather, it is an alternative to procedures that directly measure body fat, which can be difficult and expensive.  BMI can help identify people who may be at higher risk for certain medical problems.  What are BMI measurements used for?  BMI is used as a screening tool to identify possible weight problems. It helps determine whether a person is obese, overweight, a healthy weight, or underweight.  BMI is useful for:  Identifying a weight problem that may be related to a medical condition or may increase the risk for medical problems.  Promoting changes, such as changes in diet and exercise, to help reach a healthy weight. BMI screening can be repeated to see if these changes are working.  How is BMI calculated?  BMI involves measuring your weight in relation to your height. Both height and weight are measured, and the BMI is calculated from those numbers. This can be done either in English ([place] or metric measurements. Note that charts and online BMI calculators are available to help you find your BMI quickly and easily without having to do these calculations yourself.  To calculate your BMI in English ([place] measurements:    Measure your weight in pounds (lb).  Multiply the number of pounds by 703.  For example, for a person who weighs 180 lb, multiply that number by 703, which equals 126,540.  Measure your height in inches. Then multiply that number by itself to get a measurement called \"inches squared.\"  For example, for a person who is 70 inches tall, the \"inches squared\" measurement is 70 inches x 70 inches, which equals 4,900 inches squared.  Divide the total from step 2 (number of lb x 703) by the total from step 3 (inches squared): 126,540 ÷ 4,900 = 25.8. This is your BMI.    To " "calculate your BMI in metric measurements:  Measure your weight in kilograms (kg).  Measure your height in meters (m). Then multiply that number by itself to get a measurement called \"meters squared.\"  For example, for a person who is 1.75 m tall, the \"meters squared\" measurement is 1.75 m x 1.75 m, which is equal to 3.1 meters squared.  Divide the number of kilograms (your weight) by the meters squared number. In this example: 70 ÷ 3.1 = 22.6. This is your BMI.  What do the results mean?  BMI charts are used to identify whether you are underweight, normal weight, overweight, or obese. The following guidelines will be used:  Underweight: BMI less than 18.5.  Normal weight: BMI between 18.5 and 24.9.  Overweight: BMI between 25 and 29.9.  Obese: BMI of 30 or above.  Keep these notes in mind:  Weight includes both fat and muscle, so someone with a muscular build, such as an athlete, may have a BMI that is higher than 24.9. In cases like these, BMI is not an accurate measure of body fat.  To determine if excess body fat is the cause of a BMI of 25 or higher, further assessments may need to be done by a health care provider.  BMI is usually interpreted in the same way for men and women.  Where to find more information  For more information about BMI, including tools to quickly calculate your BMI, go to these websites:  Centers for Disease Control and Prevention: www.cdc.gov  American Heart Association: www.heart.org  National Heart, Lung, and Blood [place]: www.nhlbi.nih.gov  Summary  Body mass index (BMI) is a number that is calculated from a person's weight and height.  BMI may help estimate how much of a person's weight is composed of fat. BMI can help identify those who may be at higher risk for certain medical problems.  BMI can be measured using English measurements or metric measurements.  BMI charts are used to identify whether you are underweight, normal weight, overweight, or obese.  This information is not " "intended to replace advice given to you by your health care provider. Make sure you discuss any questions you have with your health care provider.  Document Revised: 09/09/2020 Document Reviewed: 07/17/2020  [employer] Patient Education © 2021 [employer] https://www.nhlbi.nih.gov/files/docs/public/heart/dash_brief.pdf\">   DASH Eating Plan  DASH stands for Dietary Approaches to Stop Hypertension. The DASH eating plan is a healthy eating plan that has been shown to:  Reduce high blood pressure (hypertension).  Reduce your risk for type 2 diabetes, heart disease, and stroke.  Help with weight loss.  What are tips for following this plan?  Reading food labels  Check food labels for the amount of salt (sodium) per serving. Choose foods with less than 5 percent of the Daily Value of sodium. Generally, foods with less than 300 milligrams (mg) of sodium per serving fit into this eating plan.  To find whole grains, look for the word \"whole\" as the first word in the ingredient list.  Shopping  Buy products labeled as \"low-sodium\" or \"no salt added.\"  Buy fresh foods. Avoid canned foods and pre-made or frozen meals.  Cooking  Avoid adding salt when cooking. Use salt-free seasonings or herbs instead of table salt or sea salt. Check with your health care provider or pharmacist before using salt substitutes.  Do not zamorano foods. Cook foods using healthy methods such as baking, boiling, grilling, roasting, and broiling instead.  Cook with heart-healthy oils, such as olive, canola, avocado, soybean, or sunflower oil.  Meal planning    Eat a balanced diet that includes:  4 or more servings of fruits and 4 or more servings of vegetables each day. Try to fill one-half of your plate with fruits and vegetables.  6-8 servings of whole grains each day.  Less than 6 oz (170 g) of lean meat, poultry, or fish each day. A 3-oz (85-g) serving of meat is about the same size as a deck of cards. One egg equals 1 oz (28 g).  2-3 servings of low-fat " dairy each day. One serving is 1 cup (237 mL).  1 serving of nuts, seeds, or beans 5 times each week.  2-3 servings of heart-healthy fats. Healthy fats called omega-3 fatty acids are found in foods such as walnuts, flaxseeds, fortified milks, and eggs. These fats are also found in cold-water fish, such as sardines, salmon, and mackerel.  Limit how much you eat of:  Canned or prepackaged foods.  Food that is high in trans fat, such as some fried foods.  Food that is high in saturated fat, such as fatty meat.  Desserts and other sweets, sugary drinks, and other foods with added sugar.  Full-fat dairy products.  Do not salt foods before eating.  Do not eat more than 4 egg yolks a week.  Try to eat at least 2 vegetarian meals a week.  Eat more home-cooked food and less restaurant, buffet, and fast food.    Lifestyle  When eating at a restaurant, ask that your food be prepared with less salt or no salt, if possible.  If you drink alcohol:  Limit how much you use to:  0-1 drink a day for women who are not pregnant.  0-2 drinks a day for men.  Be aware of how much alcohol is in your drink. In the [place] one drink equals one 12 oz bottle of beer (355 mL), one 5 oz glass of wine (148 mL), or one 1½ oz glass of hard liquor (44 mL).  General information  Avoid eating more than 2,300 mg of salt a day. If you have hypertension, you may need to reduce your sodium intake to 1,500 mg a day.  Work with your health care provider to maintain a healthy body weight or to lose weight. Ask what an ideal weight is for you.  Get at least 30 minutes of exercise that causes your heart to beat faster (aerobic exercise) most days of the week. Activities may include walking, swimming, or biking.  Work with your health care provider or dietitian to adjust your eating plan to your individual calorie needs.  What foods should I eat?  Fruits  All fresh, dried, or frozen fruit. Canned fruit in natural juice (without added sugar).  Vegetables  Fresh or  frozen vegetables (raw, steamed, roasted, or grilled). Low-sodium or reduced-sodium tomato and vegetable juice. Low-sodium or reduced-sodium tomato sauce and tomato paste. Low-sodium or reduced-sodium canned vegetables.  Grains  Whole-grain or whole-wheat bread. Whole-grain or whole-wheat pasta. Brown rice. Oatmeal. Quinoa. Bulgur. Whole-grain and low-sodium cereals. Portia bread. Low-fat, low-sodium crackers. Whole-wheat flour tortillas.  Meats and other proteins  Skinless chicken or turkey. Ground chicken or turkey. Pork with fat trimmed off. Fish and seafood. Egg whites. Dried beans, peas, or lentils. Unsalted nuts, nut butters, and seeds. Unsalted canned beans. Lean cuts of beef with fat trimmed off. Low-sodium, lean precooked or cured meat, such as sausages or meat loaves.  Dairy  Low-fat (1%) or fat-free (skim) milk. Reduced-fat, low-fat, or fat-free cheeses. Nonfat, low-sodium ricotta or cottage cheese. Low-fat or nonfat yogurt. Low-fat, low-sodium cheese.  Fats and oils  Soft margarine without trans fats. Vegetable oil. Reduced-fat, low-fat, or light mayonnaise and salad dressings (reduced-sodium). Canola, safflower, olive, avocado, soybean, and sunflower oils. Avocado.  Seasonings and condiments  Herbs. Spices. Seasoning mixes without salt.  Other foods  Unsalted popcorn and pretzels. Fat-free sweets.  The items listed above may not be a complete list of foods and beverages you can eat. Contact a dietitian for more information.  What foods should I avoid?  Fruits  Canned fruit in a light or heavy syrup. Fried fruit. Fruit in cream or butter sauce.  Vegetables  Creamed or fried vegetables. Vegetables in a cheese sauce. Regular canned vegetables (not low-sodium or reduced-sodium). Regular canned tomato sauce and paste (not low-sodium or reduced-sodium). Regular tomato and vegetable juice (not low-sodium or reduced-sodium). Pickles. Olives.  Grains  Baked goods made with fat, such as croissants, muffins, or some  breads. Dry pasta or rice meal packs.  Meats and other proteins  Fatty cuts of meat. Ribs. Fried meat. Gautam. Bologna, salami, and other precooked or cured meats, such as sausages or meat loaves. Fat from the back of a pig (fatback). Bratwurst. Salted nuts and seeds. Canned beans with added salt. Canned or smoked fish. Whole eggs or egg yolks. Chicken or turkey with skin.  Dairy  Whole or 2% milk, cream, and half-and-half. Whole or full-fat cream cheese. Whole-fat or sweetened yogurt. Full-fat cheese. Nondairy creamers. Whipped toppings. Processed cheese and cheese spreads.  Fats and oils  Butter. Stick margarine. Lard. Shortening. Ghee. Gautam fat. Tropical oils, such as coconut, palm kernel, or palm oil.  Seasonings and condiments  Onion salt, garlic salt, seasoned salt, table salt, and sea salt. Worcestershire sauce. Tartar sauce. Barbecue sauce. Teriyaki sauce. Soy sauce, including reduced-sodium. Steak sauce. Canned and packaged gravies. Fish sauce. Oyster sauce. Cocktail sauce. Store-bought horseradish. Ketchup. Mustard. Meat flavorings and tenderizers. Bouillon cubes. Hot sauces. Pre-made or packaged marinades. Pre-made or packaged taco seasonings. Relishes. Regular salad dressings.  Other foods  Salted popcorn and pretzels.  The items listed above may not be a complete list of foods and beverages you should avoid. Contact a dietitian for more information.  Where to find more information  National Heart, Lung, and Blood [place]: www.nhlbi.nih.gov  American Heart Association: www.heart.org  Academy of Nutrition and Dietetics: www.eatright.org  National Kidney Foundation: www.kidney.org  Summary  The DASH eating plan is a healthy eating plan that has been shown to reduce high blood pressure (hypertension). It may also reduce your risk for type 2 diabetes, heart disease, and stroke.  When on the DASH eating plan, aim to eat more fresh fruits and vegetables, whole grains, lean proteins, low-fat dairy, and  heart-healthy fats.  With the DASH eating plan, you should limit salt (sodium) intake to 2,300 mg a day. If you have hypertension, you may need to reduce your sodium intake to 1,500 mg a day.  Work with your health care provider or dietitian to adjust your eating plan to your individual calorie needs.  This information is not intended to replace advice given to you by your health care provider. Make sure you discuss any questions you have with your health care provider.  Document Revised: 11/20/2020 Document Reviewed: 11/20/2020  [employer] Patient Education © 2021 [employer] High-Protein and High-Calorie Diet  Eating high-protein and high-calorie foods can help you to gain weight, heal after an injury, and recover after an illness or surgery. The specific amount of daily protein and calories you need depends on:  Your body weight.  The reason this diet is recommended for you.  What is my plan?  Generally, a high-protein, high-calorie diet involves:  Eating 250-500 extra calories each day.  Making sure that you get enough of your daily calories from protein. Ask your health care provider how many of your calories should come from protein.  Talk with a health care provider, such as a diet and nutrition specialist (dietitian), about how much protein and how many calories you need each day. Follow the diet as directed by your health care provider.  What are tips for following this plan?    Preparing meals  Add whole milk, half-and-half, or heavy cream to cereal, pudding, soup, or hot cocoa.  Add whole milk to instant breakfast drinks.  Add peanut butter to oatmeal or smoothies.  Add powdered milk to baked goods, smoothies, or milkshakes.  Add powdered milk, cream, or butter to mashed potatoes.  Add cheese to cooked vegetables.  Make whole-milk yogurt parfaits. Top them with granola, fruit, or nuts.  Add cottage cheese to your fruit.  Add avocado, cheese, or both to sandwiches or salads.  Add meat, poultry, or seafood  to rice, pasta, casseroles, salads, and soups.  Use mayonnaise when making egg salad, chicken salad, or tuna salad.  Use peanut butter as a dip for vegetables or as a topping for pretzels, celery, or crackers.  Add beans to casseroles, dips, and spreads.  Add pureed beans to sauces and soups.  Replace calorie-free drinks with calorie-containing drinks, such as milk and fruit juice.  Replace water with milk or heavy cream when making foods such as oatmeal, pudding, or cocoa.  General instructions  Ask your health care provider if you should take a nutritional supplement.  Try to eat six small meals each day instead of three large meals.  Eat a balanced diet. In each meal, include one food that is high in protein.  Keep nutritious snacks available, such as nuts, trail mixes, dried fruit, and yogurt.  If you have kidney disease or diabetes, talk with your health care provider about how much protein is safe for you. Too much protein may put extra stress on your kidneys.  Drink your calories. Choose high-calorie drinks and have them after your meals.  What high-protein foods should I eat?    Vegetables  Soybeans. Peas.  Grains  Quinoa. Bulgur wheat.  Meats and other proteins  Beef, pork, and poultry. Fish and seafood. Eggs. Tofu. Textured vegetable protein (TVP). Peanut butter. Nuts and seeds. Dried beans. Protein powders.  Dairy  Whole milk. Whole-milk yogurt. Powdered milk. Cheese. Cottage Cheese. Eggnog.  Beverages  High-protein supplement drinks. Soy milk.  Other foods  Protein bars.  The items listed above may not be a complete list of high-protein foods and beverages. Contact a dietitian for more options.  What high-calorie foods should I eat?  Fruits  Dried fruit. Fruit leather. Canned fruit in syrup. Fruit juice. Avocado.  Vegetables  Vegetables cooked in oil or butter. Fried potatoes.  Grains  Pasta. Quick breads. Muffins. Pancakes. Ready-to-eat cereal.  Meats and other proteins  Peanut butter. Nuts and  seeds.  Dairy  Heavy cream. Whipped cream. Cream cheese. Sour cream. Ice cream. Custard. Pudding.  Beverages  Meal-replacement beverages. Nutrition shakes. Fruit juice. Sugar-sweetened soft drinks.  Seasonings and condiments  Salad dressing. Mayonnaise. Edil sauce. Fruit preserves or jelly. Honey. Syrup.  Sweets and desserts  Cake. Cookies. Pie. Pastries. Candy bars. Chocolate.  Fats and oils  Butter or margarine. Oil. Gravy.  Other foods  Meal-replacement bars.  The items listed above may not be a complete list of high-calorie foods and beverages. Contact a dietitian for more options.  Summary  A high-protein, high-calorie diet can help you gain weight or heal faster after an injury, illness, or surgery.  To increase your protein and calories, add ingredients such as whole milk, peanut butter, cheese, beans, meat, or seafood to meal items.  To get enough extra calories each day, include high-calorie foods and beverages at each meal.  Adding a high-calorie drink or shake can be an easy way to help you get enough calories each day. Talk with your healthcare provider or dietitian about the best options for you.  This information is not intended to replace advice given to you by your health care provider. Make sure you discuss any questions you have with your health care provider.  Document Revised: 11/30/2018 Document Reviewed: 10/30/2018  [employer] Patient Education © 2021 [employer] For more information:    Quit Now [place]  1-800-QUIT-NOW  https://[place].quitlogix.org/en-[place]/  Steps to Quit Smoking  Smoking tobacco can be harmful to your health and can affect almost every organ in your body. Smoking puts you, and those around you, at risk for developing many serious chronic diseases. Quitting smoking is difficult, but it is one of the best things that you can do for your health. It is never too late to quit.  What are the benefits of quitting smoking?  When you quit smoking, you lower your risk of developing  serious diseases and conditions, such as:  Lung cancer or lung disease, such as COPD.  Heart disease.  Stroke.  Heart attack.  Infertility.  Osteoporosis and bone fractures.  Additionally, symptoms such as coughing, wheezing, and shortness of breath may get better when you quit. You may also find that you get sick less often because your body is stronger at fighting off colds and infections. If you are pregnant, quitting smoking can help to reduce your chances of having a baby of low birth weight.  How do I get ready to quit?  When you decide to quit smoking, create a plan to make sure that you are successful. Before you quit:  Pick a date to quit. Set a date within the next two weeks to give you time to prepare.  Write down the reasons why you are quitting. Keep this list in places where you will see it often, such as on your bathroom mirror or in your car or wallet.  Identify the people, places, things, and activities that make you want to smoke (triggers) and avoid them. Make sure to take these actions:  Throw away all cigarettes at home, at work, and in your car.  Throw away smoking accessories, such as ashtrays and lighters.  Clean your car and make sure to empty the ashtray.  Clean your home, including curtains and carpets.  Tell your family, friends, and coworkers that you are quitting. Support from your loved ones can make quitting easier.  Talk with your health care provider about your options for quitting smoking.  Find out what treatment options are covered by your health insurance.  What strategies can I use to quit smoking?  Talk with your healthcare provider about different strategies to quit smoking. Some strategies include:  Quitting smoking altogether instead of gradually lessening how much you smoke over a period of time. Research shows that quitting “cold turkey” is more successful than gradually quitting.  Attending in-person counseling to help you build problem-solving skills. You are more  likely to have success in quitting if you attend several counseling sessions. Even short sessions of 10 minutes can be effective.  Finding resources and support systems that can help you to quit smoking and remain smoke-free after you quit. These resources are most helpful when you use them often. They can include:  Online chats with a counselor.  Telephone quitlines.  Printed self-help materials.  Support groups or group counseling.  Text messaging programs.  Mobile phone applications.  Taking medicines to help you quit smoking. (If you are pregnant or breastfeeding, talk with your health care provider first.) Some medicines contain nicotine and some do not. Both types of medicines help with cravings, but the medicines that include nicotine help to relieve withdrawal symptoms. Your health care provider may recommend:  Nicotine patches, gum, or lozenges.  Nicotine inhalers or sprays.  Non-nicotine medicine that is taken by mouth.  Talk with your health care provider about combining strategies, such as taking medicines while you are also receiving in-person counseling. Using these two strategies together makes you more likely to succeed in quitting than if you used either strategy on its own.  If you are pregnant or breastfeeding, talk with your health care provider about finding counseling or other support strategies to quit smoking. Do not take medicine to help you quit smoking unless told to do so by your health care provider.  What things can I do to make it easier to quit?  Quitting smoking might feel overwhelming at first, but there is a lot that you can do to make it easier. Take these important actions:  Reach out to your family and friends and ask that they support and encourage you during this time. Call telephone quitlines, reach out to support groups, or work with a counselor for support.  Ask people who smoke to avoid smoking around you.  Avoid places that trigger you to smoke, such as bars, parties, or  smoke-break areas at work.  Spend time around people who do not smoke.  Lessen stress in your life, because stress can be a smoking trigger for some people. To lessen stress, try:  Exercising regularly.  Deep-breathing exercises.  Yoga.  Meditating.  Performing a body scan. This involves closing your eyes, scanning your body from head to toe, and noticing which parts of your body are particularly tense. Purposefully relax the muscles in those areas.  Download or purchase mobile phone or tablet apps (applications) that can help you stick to your quit plan by providing reminders, tips, and encouragement. There are many free apps, such as QuitGuide from the CDC (Centers for Disease Control and Prevention). You can find other support for quitting smoking (smoking cessation) through smokefree.gov and other websites.  How will I feel when I quit smoking?  Within the first 24 hours of quitting smoking, you may start to feel some withdrawal symptoms. These symptoms are usually most noticeable 2-3 days after quitting, but they usually do not last beyond 2-3 weeks. Changes or symptoms that you might experience include:  Mood swings.  Restlessness, anxiety, or irritation.  Difficulty concentrating.  Dizziness.  Strong cravings for sugary foods in addition to nicotine.  Mild weight gain.  Constipation.  Nausea.  Coughing or a sore throat.  Changes in how your medicines work in your body.  A depressed mood.  Difficulty sleeping (insomnia).  After the first 2-3 weeks of quitting, you may start to notice more positive results, such as:  Improved sense of smell and taste.  Decreased coughing and sore throat.  Slower heart rate.  Lower blood pressure.  Clearer skin.  The ability to breathe more easily.  Fewer sick days.  Quitting smoking is very challenging for most people. Do not get discouraged if you are not successful the first time. Some people need to make many attempts to quit before they achieve long-term success. Do your  best to stick to your quit plan, and talk with your health care provider if you have any questions or concerns.  This information is not intended to replace advice given to you by your health care provider. Make sure you discuss any questions you have with your health care provider.  Document Released: 12/12/2002 Document Revised: 08/15/2017 Document Reviewed: 05/03/2016  [employer] Interactive Patient Education © 2017 [employer]

## 2024-07-11 NOTE — PROGRESS NOTES
"    Chief complaint:   Chief Complaint   Patient presents with    NECK & BACK PAIN     Pt is here for 6-8wk f/u. Pt states since his last office visit his symptoms have not improved.         Subjective     HPI: This is a 48-year-old male gentleman who I saw on May 22, 2024 for an evaluation of neck and back pain.  Patient did feel like his back issues was bothering him the most.  He was complaining of back pain that is constant along with pain going into his left lower extremity.  We did start the patient on gabapentin and Flexeril and sent for a dedicated course of physical therapy and also referral to pain management.  He comes in today for routine follow-up.  Imaging of his back did show disc protrusion at L5-S1 off to the right disc herniation on the right at L4-5.  Mild disc degeneration was noted as well.    The patient was also complaining of neck pain that is constant.  He was also complaining of pain that was rating into his right upper extremity.  We did start him on gabapentin.  He is also been taking diclofenac and Flexeril.  Imaging of his neck did show disc bulging at C5-6 off to the left.  He also had a shoulder MRI on the right that did show degeneration.    The patient says that he has been working with physical therapy at [facility] in [place] for his back issues and says that he is no longer having any pain in his leg.  He does continue to complain of constant pain in his back.  He does feel like the gabapentin may be offering him some improvement.  He is also continue to complain of neck pain but is not really having much arm pain at this time either.  He does state that he has retired from his  job as well.    Review of Systems   Musculoskeletal:  Positive for arthralgias, back pain, myalgias and neck pain.   Neurological:  Negative for weakness and numbness.   Psychiatric/Behavioral: Negative.           Objective      Vital Signs  Ht 188 cm (74\")   Wt (!) 147 kg (323 lb)   BMI 41.47 " kg/m²     Physical Exam  Constitutional:       Appearance: He is well-developed.   HENT:      Head: Normocephalic.   Eyes:      Extraocular Movements: EOM normal.      Pupils: Pupils are equal, round, and reactive to light.   Pulmonary:      Effort: Pulmonary effort is normal.   Musculoskeletal:         General: Normal range of motion.      Cervical back: Normal range of motion.   Skin:     General: Skin is warm.   Neurological:      Mental Status: He is alert and oriented to person, place, and time.      GCS: GCS eye subscore is 4. GCS verbal subscore is 5. GCS motor subscore is 6.      Cranial Nerves: No cranial nerve deficit.      Sensory: No sensory deficit.      Motor: Motor strength is normal.     Gait: Gait is intact. Gait normal.      Deep Tendon Reflexes: Reflexes are normal and symmetric.   Psychiatric:         Speech: Speech normal.         Behavior: Behavior normal.         Thought Content: Thought content normal.         Neurologic Exam     Mental Status   Oriented to person, place, and time.   Attention: normal. Concentration: normal.   Speech: speech is normal   Level of consciousness: alert  Normal comprehension.     Cranial Nerves     CN II   Visual fields full to confrontation.     CN III, IV, VI   Pupils are equal, round, and reactive to light.  Extraocular motions are normal.     CN V   Facial sensation intact.     CN VII   Facial expression full, symmetric.     CN VIII   CN VIII normal.     CN IX, X   CN IX normal.   CN X normal.     CN XI   CN XI normal.     CN XII   CN XII normal.     Motor Exam   Muscle bulk: normal    Strength   Strength 5/5 throughout.     Sensory Exam   Light touch normal.     Gait, Coordination, and Reflexes     Gait  Gait: normal    Reflexes   Reflexes 2+ except as noted.       Imaging review:  The lumbar spine that was done April 22, 2024 shows a disc protrusion at L5-S1 off to the right causing right-sided foraminal narrowing along with disc degeneration.  At L4-5  there is a disc herniation to the right that is far lateral.  No fracture visualized.  No cord signal change.     X-ray shows disc degeneration at L5-S1.  No malalignment or fracture     L4-5       L5-S1       MRI of the cervical spine notes on April 22, 2024 shows disc bulging prominent at C5-6 off to the left causing left-sided foraminal narrowing.  No fracture visualized.  No cord signal change.     X-ray of the cervical spine shows disc degeneration prominent at C4-5.          C5-6       MRI of the right shoulder did show degeneration           Assessment/Plan: The patient is doing better in some aspects but is continue to deal with the neck and back pain.  I did give him a new order for therapy to start therapy for his neck issues.  He is set up to go through pain management.  I will refill his gabapentin.  Will have him follow-up with Dr. Ingram the next available appointment.  He was told to call us if any further problems or concerns.    Patient is a nonsmoker  The patient's Body mass index is 41.47 kg/m².. BMI is above normal parameters. Recommendations include: educational material and nutrition counseling    Diagnoses and all orders for this visit:    1. Cervical radiculopathy (Primary)  -     Ambulatory Referral to Physical Therapy for Evaluation & Treatment  -     gabapentin (Neurontin) 100 MG capsule; Take 1 capsule by mouth 3 (Three) Times a Day.  Dispense: 90 capsule; Refill: 2    2. Other cervical disc degeneration at C4-C5 level  -     Ambulatory Referral to Physical Therapy for Evaluation & Treatment    3. Lumbar degenerative disc disease    4. Lumbar radiculopathy  -     gabapentin (Neurontin) 100 MG capsule; Take 1 capsule by mouth 3 (Three) Times a Day.  Dispense: 90 capsule; Refill: 2    5. Lumbar disc herniation    6. Nonsmoker    7. Class 3 severe obesity due to excess calories without serious comorbidity with body mass index (BMI) of 40.0 to 44.9 in adult        I discussed the patients  findings and my recommendations with patient  Charles Terrell, APRN  07/11/24  09:45 CDT

## 2024-10-24 ENCOUNTER — OFFICE VISIT (OUTPATIENT)
Dept: NEUROSURGERY | Facility: CLINIC | Age: 49
End: 2024-10-24
Payer: OTHER GOVERNMENT

## 2024-10-24 VITALS — BODY MASS INDEX: 40.43 KG/M2 | WEIGHT: 315 LBS | HEIGHT: 74 IN

## 2024-10-24 DIAGNOSIS — F17.220 CHEWING TOBACCO DEPENDENCE: ICD-10-CM

## 2024-10-24 DIAGNOSIS — M51.362 DEGENERATION OF INTERVERTEBRAL DISC OF LUMBAR REGION WITH DISCOGENIC BACK PAIN AND LOWER EXTREMITY PAIN: Primary | ICD-10-CM

## 2024-10-24 DIAGNOSIS — E66.813 CLASS 3 SEVERE OBESITY DUE TO EXCESS CALORIES WITHOUT SERIOUS COMORBIDITY WITH BODY MASS INDEX (BMI) OF 40.0 TO 44.9 IN ADULT: ICD-10-CM

## 2024-10-24 DIAGNOSIS — M50.30 DEGENERATION OF CERVICAL INTERVERTEBRAL DISC: ICD-10-CM

## 2024-10-24 DIAGNOSIS — M54.16 LUMBAR RADICULOPATHY: ICD-10-CM

## 2024-10-24 DIAGNOSIS — M54.12 CERVICAL RADICULOPATHY: ICD-10-CM

## 2024-10-24 DIAGNOSIS — E66.01 CLASS 3 SEVERE OBESITY DUE TO EXCESS CALORIES WITHOUT SERIOUS COMORBIDITY WITH BODY MASS INDEX (BMI) OF 40.0 TO 44.9 IN ADULT: ICD-10-CM

## 2024-10-24 DIAGNOSIS — M51.26 LUMBAR DISC HERNIATION: ICD-10-CM

## 2024-10-24 DIAGNOSIS — Z78.9 NON-SMOKER: ICD-10-CM

## 2024-10-24 PROCEDURE — 99214 OFFICE O/P EST MOD 30 MIN: CPT | Performed by: NEUROLOGICAL SURGERY

## 2024-10-24 RX ORDER — PREGABALIN 75 MG/1
75 CAPSULE ORAL 2 TIMES DAILY
COMMUNITY

## 2024-10-24 NOTE — PATIENT INSTRUCTIONS
"PATIENT TO CONTINUE TO FOLLOW UP WITH HIS PRIMARY CARE PROVIDER FOR YEARLY PHYSICAL EXAMS TO ENSURE COMPLETE HEALTH MAINTENANCE      BMI for Adults  Body mass index (BMI) is a number found using a person's weight and height. BMI can help tell how much of a person's weight is made up of fat. BMI does not measure body fat directly. It is used instead of tests that directly measure body fat, which can be difficult and expensive.  What are BMI measurements used for?  BMI is useful to:  Find out if your weight puts you at higher risk for medical problems.  Help recommend changes, such as in diet and exercise. This can help you reach a healthy weight. BMI screening can be done again to see if these changes are working.  How is BMI calculated?  Your height and weight are measured. The BMI is found from those numbers. This can be done with U[place] or metric measurements. Note that charts and online BMI calculators are available to help you find your BMI quickly and easily without doing these calculations.  To calculate your BMI in U[place] measurements:  Measure your weight in pounds (lb).  Multiply the number of pounds by 703.  So, for an adult who weighs 150 lb, multiply that number by 703: 150 x 703, which equals 105,450.  Measure your height in inches. Then multiply that number by itself to get a measurement called \"inches squared.\"  So, for an adult who is 70 inches tall, the \"inches squared\" measurement is 70 inches x 70 inches, which equals 4,900 inches squared.  Divide the total from step 2 (number of lb x 703) by the total from step 3 (inches squared): 105,450 ÷ 4,900 = 21.5. This is your BMI.  To calculate your BMI in metric measurements:    Measure your weight in kilograms (kg).  For this example, the weight is 70 kg.  Measure your height in meters (m). Then multiply that number by itself to get a measurement called \"meters squared.\"  So, for an adult who is 1.75 m tall, the \"meters squared\" measurement is 1.75 m x 1.75 " m, which equals 3.1 meters squared.  Divide the number of kilograms (your weight) by the meters squared number. In this example: 70 ÷ 3.1 = 22.6. This is your BMI.  What do the results mean?  BMI charts are used to see if you are underweight, normal weight, overweight, or obese. The following guidelines will be used:  Underweight: BMI less than 18.5.  Normal weight: BMI between 18.5 and 24.9.  Overweight: BMI between 25 and 29.9.  Obese: BMI of 30 or above.  BMI is a tool and cannot diagnose a condition. Talk with your health care provider about what your BMI means for you. Keep these notes in mind:  Weight includes fat and muscle. Someone with a muscular build, such as an athlete, may have a BMI that is higher than 24.9. In cases like these, BMI is not a correct measure of body fat.  If you have a BMI of 25 or higher, your provider may need to do more testing to find out if excess body fat is the cause.  BMI is measured the same way for males and females. Females usually have more body fat than males of the same height and weight.  Where to find more information  For more information about BMI, including tools to quickly find your BMI, go to:  Centers for Disease Control and Prevention: cdc.gov  American Heart Association: heart.org  National Heart, Lung, and Blood [place]: nhlbi.nih.gov  This information is not intended to replace advice given to you by your health care provider. Make sure you discuss any questions you have with your health care provider.  Document Revised: 09/07/2023 Document Reviewed: 08/31/2023  [employer] Information Patient Education © 2024 [employer]  DASH Eating Plan  DASH stands for Dietary Approaches to Stop Hypertension. The DASH eating plan is a healthy eating plan that has been shown to:  Lower high blood pressure (hypertension).  Reduce your risk for type 2 diabetes, heart disease, and stroke.  Help with weight loss.  What are tips for following this plan?  Reading food labels  Check food  "labels for the amount of salt (sodium) per serving. Choose foods with less than 5 percent of the Daily Value (DV) of sodium. In general, foods with less than 300 milligrams (mg) of sodium per serving fit into this eating plan.  To find whole grains, look for the word \"whole\" as the first word in the ingredient list.  Shopping  Buy products labeled as \"low-sodium\" or \"no salt added.\"  Buy fresh foods. Avoid canned foods and pre-made or frozen meals.  Cooking  Try not to add salt when you cook. Use salt-free seasonings or herbs instead of table salt or sea salt. Check with your health care provider or pharmacist before using salt substitutes.  Do not zamorano foods. Cook foods in healthy ways, such as baking, boiling, grilling, roasting, or broiling.  Cook using oils that are good for your heart. These include olive, canola, avocado, soybean, and sunflower oil.  Meal planning    Eat a balanced diet. This should include:  4 or more servings of fruits and 4 or more servings of vegetables each day. Try to fill half of your plate with fruits and vegetables.  6-8 servings of whole grains each day.  6 or less servings of lean meat, poultry, or fish each day. 1 oz is 1 serving. A 3 oz (85 g) serving of meat is about the same size as the palm of your hand. One egg is 1 oz (28 g).  2-3 servings of low-fat dairy each day. One serving is 1 cup (237 mL).  1 serving of nuts, seeds, or beans 5 times each week.  2-3 servings of heart-healthy fats. Healthy fats called omega-3 fatty acids are found in foods such as walnuts, flaxseeds, fortified milks, and eggs. These fats are also found in cold-water fish, such as sardines, salmon, and mackerel.  Limit how much you eat of:  Canned or prepackaged foods.  Food that is high in trans fat, such as fried foods.  Food that is high in saturated fat, such as fatty meat.  Desserts and other sweets, sugary drinks, and other foods with added sugar.  Full-fat dairy products.  Do not salt foods before " eating.  Do not eat more than 4 egg yolks a week.  Try to eat at least 2 vegetarian meals a week.  Eat more home-cooked food and less restaurant, buffet, and fast food.  Lifestyle  When eating at a restaurant, ask if your food can be made with less salt or no salt.  If you drink alcohol:  Limit how much you have to:  0-1 drink a day if you are female.  0-2 drinks a day if you are male.  Know how much alcohol is in your drink. In the [place] one drink is one 12 oz bottle of beer (355 mL), one 5 oz glass of wine (148 mL), or one 1½ oz glass of hard liquor (44 mL).  General information  Avoid eating more than 2,300 mg of salt a day. If you have hypertension, you may need to reduce your sodium intake to 1,500 mg a day.  Work with your provider to stay at a healthy body weight or lose weight. Ask what the best weight range is for you.  On most days of the week, get at least 30 minutes of exercise that causes your heart to beat faster. This may include walking, swimming, or biking.  Work with your provider or dietitian to adjust your eating plan to meet your specific calorie needs.  What foods should I eat?  Fruits  All fresh, dried, or frozen fruit. Canned fruits that are in their natural juice and do not have sugar added to them.  Vegetables  Fresh or frozen vegetables that are raw, steamed, roasted, or grilled. Low-sodium or reduced-sodium tomato and vegetable juice. Low-sodium or reduced-sodium tomato sauce and tomato paste. Low-sodium or reduced-sodium canned vegetables.  Grains  Whole-grain or whole-wheat bread. Whole-grain or whole-wheat pasta. Brown rice. Oatmeal. Quinoa. Bulgur. Whole-grain and low-sodium cereals. Portia bread. Low-fat, low-sodium crackers. Whole-wheat flour tortillas.  Meats and other proteins  Skinless chicken or turkey. Ground chicken or turkey. Pork with fat trimmed off. Fish and seafood. Egg whites. Dried beans, peas, or lentils. Unsalted nuts, nut butters, and seeds. Unsalted canned beans. Lean  cuts of beef with fat trimmed off. Low-sodium, lean precooked or cured meat, such as sausages or meat loaves.  Dairy  Low-fat (1%) or fat-free (skim) milk. Reduced-fat, low-fat, or fat-free cheeses. Nonfat, low-sodium ricotta or cottage cheese. Low-fat or nonfat yogurt. Low-fat, low-sodium cheese.  Fats and oils  Soft margarine without trans fats. Vegetable oil. Reduced-fat, low-fat, or light mayonnaise and salad dressings (reduced-sodium). Canola, safflower, olive, avocado, soybean, and sunflower oils. Avocado.  Seasonings and condiments  Herbs. Spices. Seasoning mixes without salt.  Other foods  Unsalted popcorn and pretzels. Fat-free sweets.  The items listed above may not be all the foods and drinks you can have. Talk to a dietitian to learn more.  What foods should I avoid?  Fruits  Canned fruit in a light or heavy syrup. Fried fruit. Fruit in cream or butter sauce.  Vegetables  Creamed or fried vegetables. Vegetables in a cheese sauce. Regular canned vegetables that are not marked as low-sodium or reduced-sodium. Regular canned tomato sauce and paste that are not marked as low-sodium or reduced-sodium. Regular tomato and vegetable juices that are not marked as low-sodium or reduced-sodium. Pickles. Olives.  Grains  Baked goods made with fat, such as croissants, muffins, or some breads. Dry pasta or rice meal packs.  Meats and other proteins  Fatty cuts of meat. Ribs. Fried meat. Gautam. Bologna, salami, and other precooked or cured meats, such as sausages or meat loaves, that are not lean and low in sodium. Fat from the back of a pig (fatback). Bratwurst. Salted nuts and seeds. Canned beans with added salt. Canned or smoked fish. Whole eggs or egg yolks. Chicken or turkey with skin.  Dairy  Whole or 2% milk, cream, and half-and-half. Whole or full-fat cream cheese. Whole-fat or sweetened yogurt. Full-fat cheese. Nondairy creamers. Whipped toppings. Processed cheese and cheese spreads.  Fats and oils  Butter.  Stick margarine. Lard. Shortening. Ghee. Gautam fat. Tropical oils, such as coconut, palm kernel, or palm oil.  Seasonings and condiments  Onion salt, garlic salt, seasoned salt, table salt, and sea salt. Worcestershire sauce. Tartar sauce. Barbecue sauce. Teriyaki sauce. Soy sauce, including reduced-sodium soy sauce. Steak sauce. Canned and packaged gravies. Fish sauce. Oyster sauce. Cocktail sauce. Store-bought horseradish. Ketchup. Mustard. Meat flavorings and tenderizers. Bouillon cubes. Hot sauces. Pre-made or packaged marinades. Pre-made or packaged taco seasonings. Relishes. Regular salad dressings.  Other foods  Salted popcorn and pretzels.  The items listed above may not be all the foods and drinks you should avoid. Talk to a dietitian to learn more.  Where to find more information  National Heart, Lung, and Blood [place] (NHLBI): nhlbi.nih.gov  American Heart Association (AHA): heart.org  Academy of Nutrition and Dietetics: eatright.org  National Kidney Foundation (NKF): kidney.org  This information is not intended to replace advice given to you by your health care provider. Make sure you discuss any questions you have with your health care provider.  Document Revised: 01/04/2024 Document Reviewed: 01/04/2024  [employer] Patient Education © 2024 [employer]  Smokeless Tobacco Information, Adult    Tobacco is a leafy plant that contains a chemical called nicotine. Tobacco use is harmful to your health. Nicotine affects the chemicals in your brain, and this can cause you to crave nicotine. These cravings can lead to addiction. Smokeless tobacco is tobacco that you put in your mouth instead of smoking it. It may also be called chewing tobacco or snuff.  Smokeless tobacco is made from the leaves of tobacco plants and comes in many forms, such as:  Loose, dry leaves.  Plugs or twists.  Moist pouches.  Dissolving lozenges or strips.  Chewing, sucking, or holding the tobacco in your mouth causes your mouth to  "make more saliva. The saliva mixes with the tobacco to make tobacco \"juice\" that is swallowed or spit out.  How can smokeless tobacco use affect me?  All forms of tobacco contain many chemicals that can harm every organ in the body. Using smokeless tobacco increases your risk for:  Cancer. Tobacco use is one of the leading causes of cancer. Smokeless tobacco is linked to cancer of the mouth, esophagus, and pancreas.  Other long-term health problems, including high blood pressure, heart disease, and stroke.  Addiction.  Pregnancy complications. Pregnant women who use smokeless tobacco are more likely to have a miscarriage or deliver a baby too early (premature delivery).  Mouth and dental problems, such as:  Bad breath.  Teeth that look yellow or brown.  Mouth sores.  Cracking and bleeding lips.  Gum recession, gum disease, or tooth decay.  Lesions on the soft tissues of your mouth (leukoplakia).  The benefits of not using smokeless tobacco include:  A healthy mind and body.  Saving money. You avoid the cost of buying tobacco and the cost of treating illnesses that are caused by tobacco.  What actions can I take to quit using tobacco?  Ask your health care provider for help to quit using smokeless tobacco. This may involve treatment.  These tips may also help you quit:  Pick a date to quit. Set a date within the next 2 weeks. This gives you time to prepare.  Write down the reasons why you are quitting. Keep this list in places where you will see it often, such as on your bathroom mirror or in your car or wallet.  Identify the people, places, things, and activities that make you want to use smokeless tobacco (triggers). Avoid them.  Tell your family and friends that you are quitting. Look for a support group in your area. Having support can make quitting easier.  Get rid of any tobacco you have and remove any tobacco smells. To do this:  Throw away all containers of tobacco at home, at work, and in your car.  Throw " away any other items that you use regularly when you chew tobacco.  Clean your car and make sure to remove all tobacco-related items.  Clean your home, including curtains and carpets.  Keep track of how many days have passed since you quit. It may help to cross days off a calendar. Remembering how long and hard you have worked to quit can help you avoid using smokeless tobacco again.  Stay positive. Be prepared for cravings. It is common to slip up when you first quit, so take it one day at a time.  Stay busy and take care of your body. Get plenty of exercise. Drink enough water to keep your urine pale yellow.  Where to find support  Ask your health care provider if there is a local support group for quitting smokeless tobacco and any available online resources, such as:  Smoke Free: www.veterans.smokefree.gov  Be Tobacco Free: www.betobaccofree.[place].gov  Tobacco Quitline: 9-930-QUIT-VET (1-322.560.7788).  Hotlines, such as 4-539-QUIT-NOW (1-106.454.2613).  Where to find more information  You can learn more about the risks of using smokeless tobacco and the benefits of quitting from these sources:  American Cancer Society: www.cancer.org  National Cancer [place]: www.cancer.gov  Centers for Disease Control and Prevention: www.cdc.gov  Food and Drug Administration: www.fda.gov/tobacco-products  Contact a health care provider if you have:  Trouble quitting smokeless tobacco use on your own.  White or other discolored patches in your mouth.  Difficulty swallowing.  A change in your voice.  Unexplained weight loss.  Stomach pain, nausea, or vomiting.  Summary  Smokeless tobacco contains many different chemicals that are known to cause cancer.  Nicotine is an addictive chemical in smokeless tobacco that affects your brain.  The benefits of not using smokeless tobacco include having a healthy mind and body and saving money.  Tell your family and friends that you are quitting. Having support can make quitting easier.  Ask  your health care provider for help quitting smokeless tobacco. This may involve treatment.  This information is not intended to replace advice given to you by your health care provider. Make sure you discuss any questions you have with your health care provider.  Document Revised: 09/14/2022 Document Reviewed: 09/14/2022  Elsevier Patient Education © 2024 [employer]

## 2024-10-24 NOTE — PROGRESS NOTES
SUBJECTIVE:  Patient ID: Paula Krueger is a 48 y.o. male is here today for follow-up.    Chief Complaint: Back pain, neck pain  Chief Complaint   Patient presents with    FOLLOW UP SPINE PAIN     Patient states his biggest complaint is low back pain.  He also has neck & RUE pain.  He did 15 sessions of therapy @ [facility] (focused on low back) but states it did not provide him with any relief.  He just got his first injection with [facility] for his low back on Monday 10/21/24 but states he has gotten mixed relief with this.  He is scheduled for neck injection on 11/18/24.       HPI  48-year-old gentleman following for complaints of neck pain and back pain could he says this is been going on for years but is gotten worse this year.  Feels it coincided with a change in his job which became more strenuous.  He is currently no longer working.  He is disabled through the [facility].  He says the back pain is by far the biggest issue.  He complains of across the back low back pain on the right and the left.  He denies any lower extremity radicular pain numbness or tingling.  He did a dedicated course of physical therapy without any improvement.  He is on muscle relaxers Lyrica and anti-inflammatories currently.  He is had 1 injection in the low back which he said I did benefit him in some ways so far.    He also complains of neck pain.  He was having trouble with the right shoulder but has gotten injection from his orthopedic specialist and says that the shoulder is improved.  He also did physical therapy for his neck without any improvement.    The following portions of the patient's history were reviewed and updated as appropriate: allergies, current medications, past family history, past medical history, past social history, past surgical history and problem list.    OBJECTIVE:    Review of Systems   Musculoskeletal:  Positive for arthralgias, back pain, myalgias and neck pain.   All other systems reviewed and are negative.          Physical Exam  Constitutional:       General: He is awake.   Eyes:      Extraocular Movements: Extraocular movements intact.      Pupils: Pupils are equal, round, and reactive to light.   Neurological:      Motor: Motor strength is normal.     Deep Tendon Reflexes: Reflexes are normal and symmetric.   Psychiatric:         Speech: Speech normal.         Neurological Exam  Mental Status  Awake. Oriented to person, place and time. Speech is normal. Language is fluent with no aphasia. Attention and concentration are normal.    Cranial Nerves  CN I: Sense of smell is normal.  CN II: Right normal visual field. Left normal visual field.  CN III, IV, VI: Extraocular movements intact bilaterally. Pupils equal round and reactive to light bilaterally.  CN V: Facial sensation is normal.  CN VII:  Right: There is no facial weakness.  Left: There is no facial weakness.  CN XI: Shoulder shrug strength is normal.  CN XII: Tongue midline without atrophy or fasciculations.    Motor  Normal muscle bulk throughout. Normal muscle tone. Strength is 5/5 throughout all four extremities.    Sensory  Sensation is intact to light touch, pinprick, vibration and proprioception in all four extremities.    Reflexes  Deep tendon reflexes are 2+ and symmetric in all four extremities.    Gait  Normal casual, toe, heel and tandem gait.      Independent Review of Radiographic Studies:   MRI of the lumbar spine shows very mild degenerative changes with no significant neuroforaminal compromise or compression.  There is a right bulging disc at L5-S1 with no significant nerve compression.    MRI of the cervical spine shows mild changes throughout with no significant neuroforaminal compromise or compression.    ASSESSMENT/PLAN:  Low back pain.  The patient complains of low back pain.  Does not require any surgical intervention.  We discussed this at length.  I would recommend he continue to work with his pain management specialist to optimize control  of his symptoms.  Cervicalgia.  The patient complains of neck pain.  He does not require any surgical intervention.  We discussed this at length.  I would recommend he continue to work with his pain management specialist to optimize control of his symptoms.      1. Degeneration of intervertebral disc of lumbar region with discogenic back pain and lower extremity pain    2. Lumbar disc herniation    3. Lumbar radiculopathy    4. Cervical radiculopathy    5. Degeneration of cervical intervertebral disc    6. Non-smoker    7. Chewing tobacco dependence    8. Class 3 severe obesity due to excess calories without serious comorbidity with body mass index (BMI) of 40.0 to 44.9 in adult        The patient's Body mass index is 41.86 kg/m².. BMI is above normal parameters. Recommendations include: educational material    Return for PRN - CONTINUE TO WORK W/PAIN MGMT.      Madhu Ingram MD

## 2025-04-07 ENCOUNTER — TRANSCRIBE ORDERS (OUTPATIENT)
Dept: ADMINISTRATIVE | Facility: HOSPITAL | Age: 50
End: 2025-04-07
Payer: OTHER GOVERNMENT

## 2025-04-07 DIAGNOSIS — I70.219 ATHEROSCLEROSIS OF NATIVE ARTERY OF EXTREMITY WITH INTERMITTENT CLAUDICATION, UNSPECIFIED EXTREMITY: Primary | ICD-10-CM

## 2025-04-07 DIAGNOSIS — R74.8 ABNORMAL LEVELS OF OTHER SERUM ENZYMES: Primary | ICD-10-CM

## 2025-04-10 ENCOUNTER — TRANSCRIBE ORDERS (OUTPATIENT)
Dept: ADMINISTRATIVE | Age: 50
End: 2025-04-10

## 2025-04-10 DIAGNOSIS — I70.219 ATHEROSCLEROSIS OF NATIVE ARTERY OF EXTREMITY WITH INTERMITTENT CLAUDICATION, UNSPECIFIED EXTREMITY: Primary | ICD-10-CM

## 2025-04-10 DIAGNOSIS — R74.8 ABNORMAL LEVELS OF OTHER SERUM ENZYMES: Primary | ICD-10-CM

## 2025-04-21 ENCOUNTER — HOSPITAL ENCOUNTER (OUTPATIENT)
Dept: ULTRASOUND IMAGING | Facility: HOSPITAL | Age: 50
Discharge: HOME | End: 2025-04-21
Payer: OTHER GOVERNMENT

## 2025-04-21 DIAGNOSIS — R74.8 ABNORMAL LEVELS OF OTHER SERUM ENZYMES: ICD-10-CM

## 2025-04-21 DIAGNOSIS — I70.219 ATHEROSCLEROSIS OF NATIVE ARTERY OF EXTREMITY WITH INTERMITTENT CLAUDICATION, UNSPECIFIED EXTREMITY: ICD-10-CM

## 2025-04-21 PROCEDURE — 76705 ECHO EXAM OF ABDOMEN: CPT

## 2025-04-21 PROCEDURE — 93923 UPR/LXTR ART STDY 3+ LVLS: CPT

## 2025-04-29 ENCOUNTER — TRANSCRIBE ORDERS (OUTPATIENT)
Dept: PHYSICAL THERAPY | Facility: HOSPITAL | Age: 50
End: 2025-04-29
Payer: OTHER GOVERNMENT

## 2025-04-29 DIAGNOSIS — M76.61 ACHILLES TENDINITIS, RIGHT LEG: Primary | ICD-10-CM

## 2025-05-01 ENCOUNTER — HOSPITAL ENCOUNTER (OUTPATIENT)
Dept: PHYSICAL THERAPY | Facility: HOSPITAL | Age: 50
Setting detail: THERAPIES SERIES
Discharge: HOME | End: 2025-05-01
Payer: OTHER GOVERNMENT

## 2025-05-01 DIAGNOSIS — M76.61 INSERTIONAL TENDINOPATHY OF RIGHT ACHILLES TENDON: Primary | ICD-10-CM

## 2025-05-01 PROCEDURE — 97535 SELF CARE MNGMENT TRAINING: CPT

## 2025-05-01 PROCEDURE — 97162 PT EVAL MOD COMPLEX 30 MIN: CPT

## 2025-05-01 PROCEDURE — 97032 APPL MODALITY 1+ESTIM EA 15: CPT

## 2025-05-01 NOTE — PROGRESS NOTES
Physical Therapy Initial Evaluation and Plan of Care  [facility]  A department of [address] [address]    Patient: Paula Krueger               : 1975  Visit Date: 2025  Referring practitioner: Jt Meadows DPM  Date of Initial Visit: 2025    Visit Diagnoses:    ICD-10-CM ICD-9-CM   1. Insertional tendinopathy of right Achilles tendon  M76.61 726.71     Past Medical History:   Diagnosis Date    Anxiousness     Back pain     Back pain     Difficulty urinating     Genital sore     Hallucinations     Hyperlipidemia     Low back pain     Multiple food allergies     Neck pain     Neck pain     Nervousness     NS (neck stiffness)     Numbness and tingling of right upper extremity     Sleep apnea     Tremors of nervous system     Tremors of nervous system     Weakness      Past Surgical History:   Procedure Laterality Date    SHOULDER SURGERY Bilateral 2004         SUBJECTIVE     Subjective Evaluation    History of Present Illness  Mechanism of injury: He's been having issues for months and months. They say that there is a spur on his R heel that is agitating it. As a result, it is rather painful and causes him to limp. Ascending/descending steps makes it worse. He got out of a CAM boot 3 weeks ago, and it might have helped just a bit. He had an injection the beginning of march, which helped a little bit. This allowed the pain to become more predictable. Additionally gets injections in his left hip.       Patient Occupation: Retired Quality of life: good    Pain  Current pain ratin  At best pain rating: 3  At worst pain ratin  Location: R lateral heel  Quality: sharp and burning  Relieving factors: medications and ice (diclofenac gel + ice)  Aggravating factors: stairs    Social Support  Lives in: one-story house (able to remain on one level)    Diagnostic Tests  X-ray: abnormal    Treatments  Previous treatment:  injection treatment and immobilization  Patient Goals  Patient goals for therapy: decreased pain  Patient goal: Able to run again (last run in December, PLOF 2 miles/20 minutes)       R achilles     Outcome Measure:     PT G-Codes  Outcome Measure Options: FAAM    OBJECTIVE     Objective       Lower Extremity MMT and ROM    LEFT RIGHT   HIP Strength ROM Strength R ROM   flexion 5  5    extension 5  4+    ABD 4+  4    ADD        IR       ER              KNEE       flexion       extension               ANKLE       Dorsiflexion KE  4 deg 4+ 8 deg   Dorsiflexion KF  10 deg  14 deg   plantarflexion 5  2    eversion 5  5    inversion 5  5    *pain  Hx of shattered L ankle    Modalities Comments   Cold laser/Estim Combo treatment using CP/CIM setting  to left lateral Achilles insertion    Tx to Date: 1   Minutes 10           Therapy Education/Self Care 81661   Education offered today Nature of condition, plan of care moving forward, HEP provided and trained   [employer] Code    Ongoing HEP   Access Code: [email or web address]  URL: [email or web address]  Date: 05/01/2025  Prepared by: Farhana Maldonado    Exercises  - Heel Raises with Counter Support  - 1 x daily - 7 x weekly - 3 sets - 10 reps - 5 hold  - Standing Bilateral Gastroc Stretch with Step  - 2-3 x daily - 7 x weekly - 3 reps - 15-60 sec hold  - Standing Soleus Stretch on Step  - 2-3 x daily - 7 x weekly - 3 reps - 15-60 sec hold   Timed Minutes 13       Total Timed Treatment:     23 mins  Total Time of Visit:            40   mins    ASSESSMENT/PLAN     GOALS:    Goals                                              Progress Note due by 5/31/2025                                                          Recert due by 7/29/2025   STG by: 3 weeks Comments Date Status   Improve passive ankle DF to 20 deg without pain      Able to a send/descend stairs without increased pain greater than 1/10                  LTG by: 6 weeks      Able to perform at least 3 single-leg heel raises  without increased pain      FAAM score of 85 or more 59     Increased right hip strength to 5/5 to normalize loading through the right lower extremity      Able to run 1 mile without increased pain to demonstrate progress toward PLOF      Independent with HEP for flexibility and stability                     Anticipated CPT codes: Gait Training 88636, Therapeutic Exercise 87171, Manual Therapy 45015, Therapeutic Activity 04989, Neuromuscular ReEducation 28392, Self Care/Home Management 25302, and Estim Attended [id number]      Assessment & Plan       Assessment  Impairments: abnormal coordination, abnormal gait, abnormal or restricted ROM, activity intolerance, impaired balance, impaired physical strength, lacks appropriate home exercise program, pain with function and weight-bearing intolerance   Functional limitations: walking, pushing, uncomfortable because of pain and standing   Assessment details: Paula is a 49-year-old male presenting with right insertional Achilles tendinopathy on the lateral side which causes him pain through his daily life, MRADLs, stair ascending/descending, and impairs his ability to run.  PLOF was running 2 miles/approximately 20 minutes on his land, but the last time he was able to do this was December.  Right lower extremity was immobilized in a cam boot for a period of time, and the attempted injections.  While this helped minimally, he continues to have pain and issues.  He additionally presents with weaknesses through the right hip and gastrocs which likely contributed to uneven loading and therefore contributing to his condition.  While his left gastroc soleus and gastrocnemius are tighter than the right, he has a history of left ankle fracture.  Right ankle tightness has greater contribution from gastrocnemius versus soleus. The Pt would benefit from skilled PTx to decrease pain, improve functional mobility, progress toward goals, and increase overall quality of life.    Prognosis:  good    Plan  Therapy options: will be seen for skilled therapy services  Planned modality interventions: dry needling, cryotherapy, TENS and low level laser therapy  Planned therapy interventions: balance/weight-bearing training, body mechanics training, flexibility, functional ROM exercises, gait training, home exercise program, joint mobilization, manual therapy, neuromuscular re-education, soft tissue mobilization, strengthening, stretching and therapeutic activities  Frequency: 2x week  Duration in weeks: 8  Treatment plan discussed with: patient  Plan details: Work toward increased bilateral lower extremity gastrocnemius and soleus extensibility, increased right hip strength, increased plantarflexion strength.         SIGNATURE: Farhana Maldonado PT DPT, [place] License #: [id number]    Electronically Signed on 5/1/2025      Initial Certification  Certification Period: 5/1/2025 through 7/29/2025  I certify that the therapy services are furnished while this patient is under my care.  The services outlined above are required by this patient, and will be reviewed every 90 days.     PHYSICIAN: Jt Meadows DPM (NPI: [id number])    Signature____________________________________________DATE: _________     Please sign and return via fax to [phone].   @[facility]@

## 2025-05-06 ENCOUNTER — HOSPITAL ENCOUNTER (OUTPATIENT)
Dept: PHYSICAL THERAPY | Facility: HOSPITAL | Age: 50
Setting detail: THERAPIES SERIES
Discharge: HOME | End: 2025-05-06
Payer: OTHER GOVERNMENT

## 2025-05-06 DIAGNOSIS — M76.61 INSERTIONAL TENDINOPATHY OF RIGHT ACHILLES TENDON: Primary | ICD-10-CM

## 2025-05-06 PROCEDURE — 97140 MANUAL THERAPY 1/> REGIONS: CPT

## 2025-05-06 PROCEDURE — 97110 THERAPEUTIC EXERCISES: CPT

## 2025-05-06 NOTE — THERAPY TREATMENT NOTE
Physical Therapy Treatment Note  [facility]  A [address] [address]    Patient: Paula Krueger                                                 Visit Date: 2025  :     1975    Referring practitioner:    Jt Meadows DPM  Date of Initial Visit:          Type: THERAPY  Episode: R Achilles tendinopathy  Number of visits this episode: 2    Visit Diagnoses:    ICD-10-CM ICD-9-CM   1. Insertional tendinopathy of right Achilles tendon  M76.61 726.71     SUBJECTIVE     Subjective:  Pt reports his R calf is okay, his R foot is at about a 2. He states at the end of the day it is hard to push on a gas pedal     PAIN: 2/10         OBJECTIVE     Objective        Therapeutic Exercises    91180 Units Comments   Shuttle press- full LE ext calf raises with bands 1 min x 2 BLE full stretch, concentric raise on RLE only    Side lying on shuttle press + R iso hip abduction  2 x 15    Standing on stair step- BLE calf raise with weight shift to RLE for eccentric lowering 2 x 10 Minor increase in pain. Discussed modifying to continue using LLE with a greater WS to R to appropriately load R tendon    Standing hip abduction RLE 2 x 15    Standing hip ext RLE 2 x 15    Timed Minutes 30     Manual Therapy     31566  Comments   Prone IASTM scraping with edge tool to R achilles tendon                     Timed Minutes 13          Therapy Education/Self Care 02881   Education offered today    Medbride Code     Ongoing HEP   Access Code: [email or web address]  URL: [email or web address]  Date: 2025  Prepared by: Farhana Maldonado     Exercises  - Heel Raises with Counter Support  - 1 x daily - 7 x weekly - 3 sets - 10 reps - 5 hold  - Standing Bilateral Gastroc Stretch with Step  - 2-3 x daily - 7 x weekly - 3 reps - 15-60 sec hold  - Standing Soleus Stretch on Step  - 2-3 x daily - 7 x weekly - 3 reps - 15-60 sec hold   Timed Minutes            Total Timed Treatment:     43   mins  Total Time of Visit:             43   mins         ASSESSMENT/PLAN     GOALS          Goals                                              Progress Note due by 5/31/2025                                                          Recert due by 7/29/2025   STG by: 3 weeks Comments Date Status   Improve passive ankle DF to 20 deg without pain         Able to a send/descend stairs without increased pain greater than 1/10                             LTG by: 6 weeks         Able to perform at least 3 single-leg heel raises without increased pain  Performed activities to strengthen this   5/6  ongoing   FAAM score of 85 or more 59       Increased right hip strength to 5/5 to normalize loading through the right lower extremity         Able to run 1 mile without increased pain to demonstrate progress toward PLOF         Independent with HEP for flexibility and stability                                Anticipated CPT codes: Gait Training 49688, Therapeutic Exercise 66123, Manual Therapy 28189, Therapeutic Activity 94111, Neuromuscular ReEducation 11406, Self Care/Home Management 87066, and Estim Attended 81622        Assessment/Plan     ASSESSMENT:   Today is pt's first day following initial evaluation, thus no goals have been met. He reports greater fatigue at the end of the day in his achilles tendon. He demonstrates difficulty with eccentric control of his R calf mms which was addressed today with exercise. He did well with all activities provided with minimal complain following stair activity, which was modified.     PLAN:    Work toward increased bilateral lower extremity gastrocnemius and soleus extensibility, increased right hip strength, increased plantarflexion strength.     SIGNATURE: Karina Mckeon PT DPT, [place] License #: [id number]  Electronically Signed on 5/6/2025        Jeancarlos Rodriguez  [place] [address]  [phone]

## 2025-05-13 ENCOUNTER — HOSPITAL ENCOUNTER (OUTPATIENT)
Dept: PHYSICAL THERAPY | Facility: HOSPITAL | Age: 50
Setting detail: THERAPIES SERIES
Discharge: HOME | End: 2025-05-13
Payer: OTHER GOVERNMENT

## 2025-05-13 DIAGNOSIS — M76.61 INSERTIONAL TENDINOPATHY OF RIGHT ACHILLES TENDON: Primary | ICD-10-CM

## 2025-05-13 PROCEDURE — 97110 THERAPEUTIC EXERCISES: CPT

## 2025-05-13 PROCEDURE — 97140 MANUAL THERAPY 1/> REGIONS: CPT

## 2025-05-13 NOTE — THERAPY TREATMENT NOTE
"  Physical Therapy Treatment Note  [facility]  A department [place] [address], [place] [address]    Patient: Paula Krueger                                                 Visit Date: 2025  :     1975    Referring practitioner:    Jt Meadows DPM  Date of Initial Visit:          Type: THERAPY  Episode: R Achilles tendinopathy  Number of visits this episode: 3    Visit Diagnoses:    ICD-10-CM ICD-9-CM   1. Insertional tendinopathy of right Achilles tendon  M76.61 726.71     SUBJECTIVE     Subjective: States he is sore today,noting he had some injections in his back yesterday. Notes his foot is doing a little better, noting it is not as painful or as tight.     PAIN: 2/10     OBJECTIVE     Objective     Therapeutic Exercises    65524 Units Comments   Gastroc/soleus stretches on 4\" box     R calf raises @ shuttle press 8 bands 1.5 min x2    Walking in clinic          Timed Minutes 14     Manual Therapy     31569  Comments   DF stretches knee straight/bent    STM to gastroc/soleus/achilles    IASTM w/ Powerboost L1 ball to R calf globally    R ankle PROM    R TC joint AP mobs    R ankle LAD        Timed Minutes 26        Therapy Education/Self Care 89975   Education offered today    Medbride Code     Ongoing HEP   Access Code: [email or web address]  URL: [email or web address]  Date: 2025  Prepared by: Farhana Maldonado     Exercises  - Heel Raises with Counter Support  - 1 x daily - 7 x weekly - 3 sets - 10 reps - 5 hold  - Standing Bilateral Gastroc Stretch with Step  - 2-3 x daily - 7 x weekly - 3 reps - 15-60 sec hold  - Standing Soleus Stretch on Step  - 2-3 x daily - 7 x weekly - 3 reps - 15-60 sec hold   Timed Minutes         Total Timed Treatment:     40   mins  Total Time of Visit:             40   mins         ASSESSMENT/PLAN     GOALS  Goals                                              Progress Note due by 2025                     "                                      Recert due by 7/29/2025   STG by: 3 weeks Comments Date Status   Improve passive ankle DF to 20 deg without pain         Able to a send/descend stairs without increased pain greater than 1/10                             LTG by: 6 weeks         Able to perform at least 3 single-leg heel raises without increased pain  Performed activities to strengthen this   5/6  ongoing   FAAM score of 85 or more 59       Increased right hip strength to 5/5 to normalize loading through the right lower extremity         Able to run 1 mile without increased pain to demonstrate progress toward PLOF         Independent with HEP for flexibility and stability                                Anticipated CPT codes: Gait Training 98961, Therapeutic Exercise 62928, Manual Therapy 32652, Therapeutic Activity 94260, Neuromuscular ReEducation 87896, Self Care/Home Management 05741, and Estim Attended 64540      Assessment/Plan     ASSESSMENT:   Pt reported some cont tightness in his calf, noting it does feel better since last time but he still has increased stiffness in the mornings. He noted some burning and fatigue following shuttle press, but after a short rest break it felt better. Following treatment he reported some decreased tightness and stiffness with some improved mobility.      PLAN:    Work toward increased bilateral lower extremity gastrocnemius and soleus extensibility, increased right hip strength, increased plantarflexion strength.     SIGNATURE: Dane Paris PTA, [place] License #: [id number]  Electronically Signed on 5/13/2025        [place] [address]  [phone]

## 2025-05-21 ENCOUNTER — HOSPITAL ENCOUNTER (OUTPATIENT)
Dept: PHYSICAL THERAPY | Facility: HOSPITAL | Age: 50
Setting detail: THERAPIES SERIES
Discharge: HOME | End: 2025-05-21
Payer: OTHER GOVERNMENT

## 2025-05-21 DIAGNOSIS — M76.61 INSERTIONAL TENDINOPATHY OF RIGHT ACHILLES TENDON: Primary | ICD-10-CM

## 2025-05-21 PROCEDURE — 97140 MANUAL THERAPY 1/> REGIONS: CPT

## 2025-05-21 PROCEDURE — 97110 THERAPEUTIC EXERCISES: CPT

## 2025-05-21 NOTE — THERAPY TREATMENT NOTE
"  Physical Therapy Treatment Note  [facility]  A department [place] [address], [place] [address]    Patient: Paula Krueger                                                 Visit Date: 2025  :     1975    Referring practitioner:    Jt Meadows DPM  Date of Initial Visit:          Type: THERAPY  Episode: R Achilles tendinopathy  Number of visits this episode: 4    Visit Diagnoses:    ICD-10-CM ICD-9-CM   1. Insertional tendinopathy of right Achilles tendon  M76.61 726.71     SUBJECTIVE     Subjective: States he is a little sore today, noting the mornings are not bad but at the end of the day he can feel it. Notes he has been walking on the treadmill and by the end of it he feels very sore and stiff.      PAIN: 1/10     OBJECTIVE     Objective     Therapeutic Exercises    72627 Units Comments   Gastroc/soleus stretches on 4\" box     Bosu lunges dome side up 2x10    B calf raises @ 4\" step x15 Eccentric control   BLE calf raises up, RLE down x10 Kept L toe on step    BLE shuttle press 8 bands 5 min         Timed Minutes 23     Manual Therapy     31489  Comments   DF stretches knee straight/bent    STM to gastroc/soleus/achilles        Timed Minutes 18        Therapy Education/Self Care 74308   Education offered today    Medbride Code     Ongoing HEP   Access Code: [email or web address]  URL: [email or web address]  Date: 2025  Prepared by: Farhana Maldonado     Exercises  - Heel Raises with Counter Support  - 1 x daily - 7 x weekly - 3 sets - 10 reps - 5 hold  - Standing Bilateral Gastroc Stretch with Step  - 2-3 x daily - 7 x weekly - 3 reps - 15-60 sec hold  - Standing Soleus Stretch on Step  - 2-3 x daily - 7 x weekly - 3 reps - 15-60 sec hold   Timed Minutes         Total Timed Treatment:     41   mins  Total Time of Visit:             41   mins         ASSESSMENT/PLAN     GOALS  Goals                                              Progress " Note due by 5/31/2025                                                          Recert due by 7/29/2025   STG by: 3 weeks Comments Date Status   Improve passive ankle DF to 20 deg without pain         Able to a send/descend stairs without increased pain greater than 1/10                             LTG by: 6 weeks         Able to perform at least 3 single-leg heel raises without increased pain  Performed activities to strengthen this   5/6  ongoing   FAAM score of 85 or more 59       Increased right hip strength to 5/5 to normalize loading through the right lower extremity         Able to run 1 mile without increased pain to demonstrate progress toward PLOF         Independent with HEP for flexibility and stability                                Anticipated CPT codes: Gait Training 33840, Therapeutic Exercise 67936, Manual Therapy 23053, Therapeutic Activity 11835, Neuromuscular ReEducation 45148, Self Care/Home Management 82897, and Estim Attended 81768      Assessment/Plan     ASSESSMENT:   Pt reported some cont tightness in his calf, noting it is starting to feel better in the mornings but the end of the day is still worse. He noted some burning and fatigue following calf raises. Following treatment he reported some decreased tightness and stiffness with some improved mobility.      PLAN:    Work toward increased bilateral lower extremity gastrocnemius and soleus extensibility, increased right hip strength, increased plantarflexion strength.     SIGNATURE: Dane Paris PTA, [place] License #: [id number]  Electronically Signed on 5/21/2025        [place] [address]  [phone]

## 2025-05-29 ENCOUNTER — APPOINTMENT (OUTPATIENT)
Dept: PHYSICAL THERAPY | Facility: HOSPITAL | Age: 50
End: 2025-05-29
Payer: OTHER GOVERNMENT

## 2025-06-04 ENCOUNTER — APPOINTMENT (OUTPATIENT)
Dept: PHYSICAL THERAPY | Facility: HOSPITAL | Age: 50
End: 2025-06-04
Payer: OTHER GOVERNMENT

## 2025-06-11 ENCOUNTER — HOSPITAL ENCOUNTER (OUTPATIENT)
Dept: PHYSICAL THERAPY | Facility: HOSPITAL | Age: 50
Setting detail: THERAPIES SERIES
Discharge: HOME | End: 2025-06-11
Payer: OTHER GOVERNMENT

## 2025-06-11 DIAGNOSIS — M76.61 INSERTIONAL TENDINOPATHY OF RIGHT ACHILLES TENDON: Primary | ICD-10-CM

## 2025-06-11 PROCEDURE — 97140 MANUAL THERAPY 1/> REGIONS: CPT

## 2025-06-11 PROCEDURE — 97110 THERAPEUTIC EXERCISES: CPT

## 2025-06-11 NOTE — THERAPY TREATMENT NOTE
Physical Therapy Treatment Note and 30 Day Progress Note  [facility]  [address], [place] [address]    Patient: Paula Krueger                                                 Visit Date: 2025  :     1975    Referring practitioner:    Jt Meadows DPM  Date of Initial Visit:          Type: THERAPY  Episode: R Achilles tendinopathy  Number of visits this episode: 5    Visit Diagnoses:    ICD-10-CM ICD-9-CM   1. Insertional tendinopathy of right Achilles tendon  M76.61 726.71     SUBJECTIVE     Subjective: States his foot has been on and off, but when he wears actual shoes it makes it worse but when he wears crocs or boots it feels better.      PAIN: 3/10     OBJECTIVE     Objective     Therapeutic Exercises    46027 Units Comments   Assessed goals for PN          Gastroc/soleus stretches on slantboard     AP ws'ing on foam roller x15 Fwd/bwd   Heel elevated toe raises 2x15    R calf raises @ shuttle press 5 bands X15 ea Foot straight and toe in        Timed Minutes 30     Manual Therapy     [id number]  Comments   DF stretches knee straight/bent    STM to gastroc/soleus/achilles        Timed Minutes 10        Therapy Education/Self Care 95494   Education offered today    [employer] Code     Ongoing HEP   Access Code: [email or web address]  URL: [email or web address]  Date: 2025  Prepared by: Farhana Maldonado     Exercises  - Heel Raises with Counter Support  - 1 x daily - 7 x weekly - 3 sets - 10 reps - 5 hold  - Standing Bilateral Gastroc Stretch with Step  - 2-3 x daily - 7 x weekly - 3 reps - 15-60 sec hold  - Standing Soleus Stretch on Step  - 2-3 x daily - 7 x weekly - 3 reps - 15-60 sec hold   Timed Minutes         Total Timed Treatment:     40   mins  Total Time of Visit:             40   mins         ASSESSMENT/PLAN     GOALS  Goals                                              Progress Note due by 2025                                                           Recert due by 7/29/2025   STG by: 3 weeks Comments Date Status   Improve passive ankle DF to 20 deg without pain  15 deg  6/11 ongoing   Able to a send/descend stairs without increased pain greater than 1/10  3/10 walking today 6/11 ongoing                       LTG by: 6 weeks         Able to perform at least 3 single-leg heel raises without increased pain  increased pain 6/11 ongoing   FAAM score of 85 or more 54 6/11 ongoing   Increased right hip strength to 5/5 to normalize loading through the right lower extremity  4+/5 6/11 ongoing   Able to run 1 mile without increased pain to demonstrate progress toward PLOF  3/10 walking today 6/11 ongoing   Independent with HEP for flexibility and stability  performs daily 6/11 ongoing                Anticipated CPT codes: Gait Training 41037, Therapeutic Exercise 47744, Manual Therapy 00474, Therapeutic Activity 77700, Neuromuscular ReEducation 59043, Self Care/Home Management 54065, and Estim Attended [id number]      Assessment/Plan     ASSESSMENT:   Pt reported 30% improvement since SOC, noting the pain area is smaller but he still has issues when wearing shoes with a back on them. He has not met any goals so far, but his mobility is improving. He noted slight discomfort during shuttle press calf raises if his toes were turned in too far, Skilled PT is indicated to improve his quality of life while increasing his ability to perform daily tasks and activities.      PLAN:    Work toward increased bilateral lower extremity gastrocnemius and soleus extensibility, increased right hip strength, increased plantarflexion strength.     SIGNATURE: Dane Paris PTA, [place] License #: [id number]  Electronically Signed on 6/11/2025        [address] [place] [address]. [address]  [phone]

## 2025-06-12 NOTE — PROGRESS NOTES
Progress Note Addendum    Patient: Paula Krueger           : 1975  Visit Date: 2025  Referring practitioner: Jt Meadows DPM  Date of Initial Visit: Type: THERAPY  Episode: R Achilles tendinopathy    Visit Diagnoses:    ICD-10-CM ICD-9-CM   1. Insertional tendinopathy of right Achilles tendon  M76.61 726.71          Clinical Progress: improved  Home Program Compliance: Yes  Progress toward previous goals: Progressing  Prognosis to achieve goals: good    Objective   See PTA note for goals     Assessment & Plan       Assessment  Impairments: abnormal coordination, abnormal gait, abnormal or restricted ROM, activity intolerance, impaired balance, impaired physical strength, lacks appropriate home exercise program, pain with function and weight-bearing intolerance   Functional limitations: walking, pushing, uncomfortable because of pain and standing   Prognosis: good    Plan  Therapy options: will be seen for skilled therapy services  Planned modality interventions: dry needling, cryotherapy, TENS and low level laser therapy  Planned therapy interventions: balance/weight-bearing training, body mechanics training, flexibility, functional ROM exercises, gait training, home exercise program, joint mobilization, manual therapy, neuromuscular re-education, soft tissue mobilization, strengthening, stretching and therapeutic activities  Frequency: 2x week  Duration in weeks: 8  Treatment plan discussed with: patient        Anticipated CPT codes: Gait Training 14059, Therapeutic Exercise 68469, Manual Therapy 70638, Therapeutic Activity 86506, Neuromuscular ReEducation 37024, Self Care/Home Management 37982, Estim Attended 94002, and Estim Unattended 13447    I reviewed the treatment and goals with Parish Paris PTA  and agree with the POC.    SIGNATURE: Farhana Maldonado PT DPT, License #: [id number]    Electronically Signed on 2025

## 2025-06-17 ENCOUNTER — APPOINTMENT (OUTPATIENT)
Dept: PHYSICAL THERAPY | Facility: HOSPITAL | Age: 50
End: 2025-06-17
Payer: OTHER GOVERNMENT

## 2025-06-24 ENCOUNTER — HOSPITAL ENCOUNTER (OUTPATIENT)
Dept: PHYSICAL THERAPY | Facility: HOSPITAL | Age: 50
Setting detail: THERAPIES SERIES
Discharge: HOME | End: 2025-06-24
Payer: OTHER GOVERNMENT

## 2025-06-24 DIAGNOSIS — M76.61 INSERTIONAL TENDINOPATHY OF RIGHT ACHILLES TENDON: Primary | ICD-10-CM

## 2025-06-24 PROCEDURE — 97140 MANUAL THERAPY 1/> REGIONS: CPT

## 2025-06-24 PROCEDURE — 97110 THERAPEUTIC EXERCISES: CPT

## 2025-06-24 NOTE — THERAPY TREATMENT NOTE
"  Physical Therapy Treatment Note  [facility]  [address], [place] [address]    Patient: Paula Krueger                                                 Visit Date: 2025  :     1975    Referring practitioner:    Jt Meadows DPM  Date of Initial Visit:          Type: THERAPY  Episode: R Achilles tendinopathy  Number of visits this episode: 6    Visit Diagnoses:    ICD-10-CM ICD-9-CM   1. Insertional tendinopathy of right Achilles tendon  M76.61 726.71     SUBJECTIVE     Subjective: States his foot has been on and off, as soon as he puts shoes on the pressure on the back of his heel causes pain.      PAIN: 2/10     OBJECTIVE     Objective     Therapeutic Exercises    67754 Units Comments   Gastroc/soleus stretches on slantboard     B calf raises, 2 up 1 down on 4\" step x10    Heel elevated toe raises 2x15    BLE shuttle press 8 bands 4 min    Fwd/bwd step ups @ 6\" step X15 ea    R calf raises @ shuttle press 8 bands 1.5 min    Fwd lunges w/ blue blob x20         Timed Minutes 29     Manual Therapy     [id number]  Comments   DF stretches knee straight/bent    STM to gastroc/soleus/achilles        Timed Minutes 12        Therapy Education/Self Care [id number]   Education offered today    [employer] Code     Ongoing HEP   Access Code: [email or web address]  URL: [email or web address]  Date: 2025  Prepared by: Farhana Maldonado     Exercises  - Heel Raises with Counter Support  - 1 x daily - 7 x weekly - 3 sets - 10 reps - 5 hold  - Standing Bilateral Gastroc Stretch with Step  - 2-3 x daily - 7 x weekly - 3 reps - 15-60 sec hold  - Standing Soleus Stretch on Step  - 2-3 x daily - 7 x weekly - 3 reps - 15-60 sec hold   Timed Minutes         Total Timed Treatment:     41   mins  Total Time of Visit:             41   mins         ASSESSMENT/PLAN     GOALS  Goals                                              Progress Note due by 2025       "                                                    Recert due by 7/29/2025   STG by: 3 weeks Comments Date Status   Improve passive ankle DF to 20 deg without pain  20 deg 6/24 MET   Able to a send/descend stairs without increased pain greater than 1/10  3/10 walking today 6/11 ongoing                       LTG by: 6 weeks         Able to perform at least 3 single-leg heel raises without increased pain  increased pain 6/11 ongoing   FAAM score of 85 or more 54 6/11 ongoing   Increased right hip strength to 5/5 to normalize loading through the right lower extremity  4+/5 6/11 ongoing   Able to run 1 mile without increased pain to demonstrate progress toward PLOF  3/10 walking today 6/11 ongoing   Independent with HEP for flexibility and stability  performs daily 6/11 ongoing                Anticipated CPT codes: Gait Training 11427, Therapeutic Exercise 34896, Manual Therapy 72136, Therapeutic Activity 70403, Neuromuscular ReEducation 21396, Self Care/Home Management 99188, and Estim Attended 34522      Assessment/Plan   ASSESSMENT:   Pt reported some cont heel pain mainly when wearing shoes, noting when pressure is put on his heel the pain gets worse but if he does not wear shoes he barely notices it. He noted some discomfort during calf raises on the step, mainly during the lowering phase on the RLE.      PLAN:    Work toward increased bilateral lower extremity gastrocnemius and soleus extensibility, increased right hip strength, increased plantarflexion strength.     SIGNATURE: Dane Paris PTA, [place] License #: [id number]  Electronically Signed on 6/24/2025        [address] [place] [address]. [address]  [phone]

## 2025-06-27 ENCOUNTER — APPOINTMENT (OUTPATIENT)
Dept: PHYSICAL THERAPY | Facility: HOSPITAL | Age: 50
End: 2025-06-27
Payer: OTHER GOVERNMENT